# Patient Record
Sex: MALE | Race: WHITE | Employment: FULL TIME | URBAN - METROPOLITAN AREA
[De-identification: names, ages, dates, MRNs, and addresses within clinical notes are randomized per-mention and may not be internally consistent; named-entity substitution may affect disease eponyms.]

---

## 2018-02-16 ENCOUNTER — OFFICE VISIT (OUTPATIENT)
Dept: NEPHROLOGY | Facility: CLINIC | Age: 28
End: 2018-02-16

## 2018-02-16 VITALS
HEART RATE: 74 BPM | HEIGHT: 70 IN | DIASTOLIC BLOOD PRESSURE: 68 MMHG | BODY MASS INDEX: 21.19 KG/M2 | SYSTOLIC BLOOD PRESSURE: 106 MMHG | WEIGHT: 148 LBS

## 2018-02-16 DIAGNOSIS — Z94.0 RENAL TRANSPLANT RECIPIENT: ICD-10-CM

## 2018-02-16 DIAGNOSIS — I10 ESSENTIAL HYPERTENSION: Primary | ICD-10-CM

## 2018-02-16 PROCEDURE — 99213 OFFICE O/P EST LOW 20 MIN: CPT | Performed by: INTERNAL MEDICINE

## 2018-02-16 PROCEDURE — 90670 PCV13 VACCINE IM: CPT | Performed by: INTERNAL MEDICINE

## 2018-02-16 PROCEDURE — 90471 IMMUNIZATION ADMIN: CPT | Performed by: INTERNAL MEDICINE

## 2018-02-16 PROCEDURE — 99212 OFFICE O/P EST SF 10 MIN: CPT | Performed by: INTERNAL MEDICINE

## 2018-02-16 RX ORDER — ASPIRIN 81 MG/1
81 TABLET ORAL DAILY
COMMUNITY
Start: 2016-03-12 | End: 2020-01-08

## 2018-02-16 RX ORDER — CARVEDILOL 6.25 MG/1
6.25 TABLET ORAL 2 TIMES DAILY WITH MEALS
COMMUNITY
Start: 2017-08-09

## 2018-02-16 RX ORDER — OMEGA-3-ACID ETHYL ESTERS 1 G/1
2 CAPSULE, LIQUID FILLED ORAL 2 TIMES DAILY
COMMUNITY

## 2018-02-16 RX ORDER — PREDNISONE 1 MG/1
5 TABLET ORAL DAILY
COMMUNITY
Start: 2017-08-09

## 2018-02-16 RX ORDER — MYCOPHENOLIC ACID 180 MG/1
360 TABLET, DELAYED RELEASE ORAL
COMMUNITY
Start: 2017-08-09

## 2018-02-16 RX ORDER — TACROLIMUS 1 MG/1
3 CAPSULE ORAL 2 TIMES DAILY
COMMUNITY
Start: 2018-01-19

## 2018-02-16 NOTE — PROGRESS NOTES
Eloy Martinez is here expressing them seem to get a kidney transplant.   He is almost 2 years out he is being seen at [facility] Dr. Jevon Hall at his nephrologist and surgeon is doing well his blood pressures been good about 120/80 his meds include Prograf 3 twice daily CellC

## 2018-02-16 NOTE — PATIENT INSTRUCTIONS
Great job susana     keep creat 1.3 or less  Call if problems     Pneumnonia 13 today   need pneumonia 23  One year     great to see you    See me six months

## 2018-04-04 ENCOUNTER — MED REC SCAN ONLY (OUTPATIENT)
Dept: NEPHROLOGY | Facility: CLINIC | Age: 28
End: 2018-04-04

## 2018-08-22 ENCOUNTER — OFFICE VISIT (OUTPATIENT)
Dept: NEPHROLOGY | Facility: CLINIC | Age: 28
End: 2018-08-22
Payer: COMMERCIAL

## 2018-08-22 VITALS
HEART RATE: 58 BPM | HEIGHT: 70 IN | BODY MASS INDEX: 22.05 KG/M2 | DIASTOLIC BLOOD PRESSURE: 78 MMHG | SYSTOLIC BLOOD PRESSURE: 125 MMHG | WEIGHT: 154 LBS

## 2018-08-22 DIAGNOSIS — Z94.0 RENAL TRANSPLANT RECIPIENT: Primary | ICD-10-CM

## 2018-08-22 PROCEDURE — 99212 OFFICE O/P EST SF 10 MIN: CPT | Performed by: INTERNAL MEDICINE

## 2018-08-22 PROCEDURE — 99213 OFFICE O/P EST LOW 20 MIN: CPT | Performed by: INTERNAL MEDICINE

## 2018-08-22 NOTE — PATIENT INSTRUCTIONS
Please have [facility] fax me labs    Call me a few days after each lab draw to discuss    See me February    Flu shot September    Great misti Adler

## 2018-08-22 NOTE — PROGRESS NOTES
Luly Suma is here he is doing well he is about 2 years post kidney transplant.   Just had a second daughter feeling good getting labs done every 4 months request I did ask him to have the labs faxed to me my current fax number and to call me every time he gets la

## 2020-01-08 ENCOUNTER — OFFICE VISIT (OUTPATIENT)
Dept: NEPHROLOGY | Facility: CLINIC | Age: 30
End: 2020-01-08
Payer: COMMERCIAL

## 2020-01-08 VITALS
BODY MASS INDEX: 22.14 KG/M2 | SYSTOLIC BLOOD PRESSURE: 123 MMHG | DIASTOLIC BLOOD PRESSURE: 72 MMHG | HEART RATE: 80 BPM | WEIGHT: 154.63 LBS | HEIGHT: 70 IN

## 2020-01-08 DIAGNOSIS — N18.4 CHRONIC KIDNEY DISEASE, STAGE IV (SEVERE) (HCC): ICD-10-CM

## 2020-01-08 DIAGNOSIS — Z94.0 RENAL TRANSPLANT RECIPIENT: ICD-10-CM

## 2020-01-08 DIAGNOSIS — I10 ESSENTIAL HYPERTENSION: Primary | ICD-10-CM

## 2020-01-08 PROCEDURE — 99213 OFFICE O/P EST LOW 20 MIN: CPT | Performed by: INTERNAL MEDICINE

## 2020-01-08 PROCEDURE — 99212 OFFICE O/P EST SF 10 MIN: CPT | Performed by: INTERNAL MEDICINE

## 2020-01-08 NOTE — PATIENT INSTRUCTIONS
Genesis Weaver please do labs every 3 MONTHS    SEND ME MESSAGE ON MY CHART TO DISCUSS    SEE ME July    CONSIDER VASECTOMY    HAVE A GREAT YEAR

## 2020-01-09 NOTE — PROGRESS NOTES
Murrel Apgar is here he is not real obsessive about his labs he is meeting labs at [facility] but is not know his creatinine etc. is been about 3 years he received a transplant from his dad he does know his meds he is on tacrolimus 3 mg twice daily mycophenolate 720 twi

## 2020-07-08 ENCOUNTER — OFFICE VISIT (OUTPATIENT)
Dept: NEPHROLOGY | Facility: CLINIC | Age: 30
End: 2020-07-08
Payer: COMMERCIAL

## 2020-07-08 VITALS
HEIGHT: 70 IN | WEIGHT: 162 LBS | DIASTOLIC BLOOD PRESSURE: 80 MMHG | BODY MASS INDEX: 23.19 KG/M2 | SYSTOLIC BLOOD PRESSURE: 124 MMHG | TEMPERATURE: 98 F | HEART RATE: 98 BPM

## 2020-07-08 DIAGNOSIS — Z94.0 RENAL TRANSPLANT RECIPIENT: Primary | ICD-10-CM

## 2020-07-08 PROCEDURE — 99213 OFFICE O/P EST LOW 20 MIN: CPT | Performed by: INTERNAL MEDICINE

## 2020-07-08 PROCEDURE — 99212 OFFICE O/P EST SF 10 MIN: CPT | Performed by: INTERNAL MEDICINE

## 2020-07-08 NOTE — PATIENT INSTRUCTIONS
Please do labs January April July and October    Follow-up with gabriel chappell him on your health    See me in January have a great summer stay safe

## 2020-07-08 NOTE — PROGRESS NOTES
Progress Note     Abdiel Mcnally    Status post renal transplant 4 years ago he is doing great feeling well he is working he is up-to-date with all his meds he is watching his skin denies any chest pain shortness of breath urinating fine      HISTORY symptoms  Neurological:  Negative for gait disturbance  Psychiatric:  Negative for inappropriate interaction and psychiatric symptoms       07/08/20  1550   BP: 124/80   Pulse: 98   Temp: 97.7 °F (36.5 °C)       PHYSICAL EXAM:   Constitutional: appears wel

## 2020-11-25 ENCOUNTER — APPOINTMENT (OUTPATIENT)
Dept: LAB | Facility: HOSPITAL | Age: 30
End: 2020-11-25
Attending: INTERNAL MEDICINE
Payer: COMMERCIAL

## 2020-11-25 ENCOUNTER — TELEPHONE (OUTPATIENT)
Dept: NEPHROLOGY | Facility: CLINIC | Age: 30
End: 2020-11-25

## 2020-11-25 DIAGNOSIS — R50.81 FEVER IN OTHER DISEASES: ICD-10-CM

## 2020-11-25 DIAGNOSIS — R50.81 FEVER IN OTHER DISEASES: Primary | ICD-10-CM

## 2020-11-25 NOTE — TELEPHONE ENCOUNTER
Pt states he is very fatigue and has back pain. Pt also states he is coughing and states that he has a continuous smell of chlorine.  Pt requesting an order for covid test. Please call [phone]

## 2020-11-27 ENCOUNTER — TELEPHONE (OUTPATIENT)
Dept: NEPHROLOGY | Facility: CLINIC | Age: 30
End: 2020-11-27

## 2020-11-27 NOTE — TELEPHONE ENCOUNTER
Patient returned call. (Name and  of pt verified). All results and recommendations reviewed. Patient verbalizes understanding, denies further questions and agrees with plan of care.

## 2021-09-28 ENCOUNTER — TELEPHONE (OUTPATIENT)
Dept: NEPHROLOGY | Facility: CLINIC | Age: 31
End: 2021-09-28

## 2021-09-28 NOTE — TELEPHONE ENCOUNTER
Pt states he has excessive coughing, sore throat, chills, no fever, going to transfer to Rn his chest feels  bruised from coughing so much.

## 2021-09-28 NOTE — TELEPHONE ENCOUNTER
Left message to call back message on voicemail to be evaluated at Urgent Care as Dr. Governor Landeros is out of the office today. Advised to call back if patient has any questions or concerns.

## 2021-10-05 NOTE — TELEPHONE ENCOUNTER
Pt called in to give an update that he is feeling better but he thinks he has some hearing loss in left ear.  Please follow up

## 2022-10-10 NOTE — TELEPHONE ENCOUNTER
Spoke with patient, states he did go to urgent care and was prescribed an inhaler and is feeling better. Instructed pt to call us back in a few days if symptoms worsen. Airway patent, Nasal mucosa clear. Mouth with normal mucosa. Throat has no vesicles, no oropharyngeal exudates and uvula is midline.

## 2023-05-16 ENCOUNTER — PATIENT MESSAGE (OUTPATIENT)
Dept: ADMINISTRATIVE | Age: 33
End: 2023-05-16

## 2023-05-16 DIAGNOSIS — Z94.0 RENAL TRANSPLANT RECIPIENT: Primary | ICD-10-CM

## 2023-08-17 ENCOUNTER — OFFICE VISIT (OUTPATIENT)
Dept: NEPHROLOGY | Facility: CLINIC | Age: 33
End: 2023-08-17

## 2023-08-17 VITALS
SYSTOLIC BLOOD PRESSURE: 115 MMHG | DIASTOLIC BLOOD PRESSURE: 67 MMHG | HEART RATE: 92 BPM | BODY MASS INDEX: 24.62 KG/M2 | WEIGHT: 172 LBS | HEIGHT: 70 IN

## 2023-08-17 DIAGNOSIS — D17.1 LIPOMA OF TORSO: ICD-10-CM

## 2023-08-17 DIAGNOSIS — Z94.0 RENAL TRANSPLANT RECIPIENT: ICD-10-CM

## 2023-08-17 DIAGNOSIS — I10 ESSENTIAL HYPERTENSION: Primary | ICD-10-CM

## 2023-08-17 PROCEDURE — 3074F SYST BP LT 130 MM HG: CPT | Performed by: INTERNAL MEDICINE

## 2023-08-17 PROCEDURE — 3008F BODY MASS INDEX DOCD: CPT | Performed by: INTERNAL MEDICINE

## 2023-08-17 PROCEDURE — 99203 OFFICE O/P NEW LOW 30 MIN: CPT | Performed by: INTERNAL MEDICINE

## 2023-08-17 PROCEDURE — 3078F DIAST BP <80 MM HG: CPT | Performed by: INTERNAL MEDICINE

## 2023-08-17 NOTE — PATIENT INSTRUCTIONS
See dr Krystina Wong  [phone]      Do labs every January April July and October they have standing orders at the computer you can go to the [address] [place]    Let me know after you see the surgeon    Get your eyes checked    Flu shot October COVID-vaccine in November    See me back in 1 year    Bibi Gerardo to see you Momo Melo

## 2023-08-17 NOTE — TELEPHONE ENCOUNTER
Please put in standing orders  For CBC  CMP  UA  Tacrolimus level    For every 3 months for kidney transplant thank you

## 2023-08-17 NOTE — PROGRESS NOTES
Progress Note     Phil Harley    Urine doing well he 7 years post kidney transplant from his father pressures 115/67 labs are good BK virus is negative creatinine 1.0 he is on tacrolimus 3 mg twice a day mycophenolate 180 mg 3 tablets twice a day is on Coreg 6.25 twice daily prednisone 5/day doing fine with labs complains of a lump on his back needs to see the eye doctor no other skin issues breathing well no chest pain or shortness of breath urinating well on exam he does have a lipoma about 5 cm across his left shoulder blade it is movable appears benign      HISTORY:  Past Medical History:   Diagnosis Date    Gross hematuria     IgA nephropathy     POSSIBLE PER. NG.    Varicella       Past Surgical History:   Procedure Laterality Date    TRANSPLANTATION OF KIDNEY  03/2016      Social History    Tobacco Use      Smoking status: Former        Types: Cigarettes      Smokeless tobacco: Never    Alcohol use: No      Alcohol/week: 0.0 standard drinks of alcohol        Medications (Active prior to today's visit):  Current Outpatient Medications   Medication Sig Dispense Refill    tacrolimus 1 MG Oral Cap Take 3 capsules (3 mg total) by mouth 2 (two) times daily. Mycophenolate Sodium 180 MG Oral Tab EC Take 2 tablets (360 mg total) by mouth 2 (two) times daily before meals. predniSONE 5 MG Oral Tab Take 1 tablet (5 mg total) by mouth daily. carvedilol 6.25 MG Oral Tab Take 1 tablet (6.25 mg total) by mouth 2 (two) times daily with meals. Omega-3-acid Ethyl Esters 1 g Oral Cap Take 2 capsules (2 g total) by mouth 2 (two) times daily.       AmLODIPine Besylate (NORVASC) 10 MG Oral Tab TAKE 1 TABLET BY MOUTH EVERY DAY 30 tablet 0       Allergies:  No Known Allergies      ROS:     Constitutional:  Negative for decreased activity, fever, irritability and lethargy  ENMT:  Negative for ear drainage, hearing loss and nasal drainage  Eyes:  Negative for eye discharge and vision loss  Cardiovascular: Negative for chest pain, sob  Respiratory:  Negative for cough, dyspnea and wheezing  Gastrointestinal:  Negative for abdominal pain, constipation  Genitourinary:  Negative for dysuria and hematuria urinating fine  Endocrine:  Negative for abnormal sleep patterns  Hema/Lymph:  Negative for easy bleeding and easy bruising  Integumentary:  Negative for pruritus and rash  Musculoskeletal: 5 cm lipoma looking mass over his left scapula  Neurological:  Negative for gait disturbance  Psychiatric:  Negative for inappropriate interaction and psychiatric symptoms  No tenderness over left kidney site in the left lower quadrant  Skin clear   08/17/23  1743   BP: 115/67   Pulse: 92       PHYSICAL EXAM:   Constitutional: appears well hydrated alert and responsive   Head/Face: normocephalic  Eyes/Vision: normal extraocular motion is intact  Nose/Mouth/Throat:mucous membranes are moist   Neck/Thyroid: neck is supple without adenopathy  Lymphatic: no abnormal cervical, supraclavicular adenopathy is noted  Respiratory:  lungs are clear to auscultation bilaterally  Cardiovascular: regular rate and rhythm   Abdomen: soft, non-tender, non-distended, BS normal  Skin/Hair: no unusual rashes present, no abnormal bruising noted  Back/Spine: no abnormalities noted  Musculoskeletal: no deformities  Extremities: no edema  Neurological:  Grossly normal    See note above  ASSESSMENT/PLAN:   Assessment   Essential hypertension  (primary encounter diagnosis)  Renal transplant recipient  Lipoma of torso    #1 most likely lipoma see general surgery Dr. Luis F Man for eval  #2 renal transplant labs every 3 months standing orders placed  #3 hypertension controlled  #4 immunizations flu shot October COVID in November get a pneumonia 20 sometime in the near future    See me back in 1 year will call me after he sees Dr. Charley Stein This Visit:  No orders of the defined types were placed in this encounter.       Meds This Visit:  Requested Prescriptions      No prescriptions requested or ordered in this encounter       Imaging & Referrals:  None     8/17/2023  Jerome Bowers MD

## 2023-09-19 ENCOUNTER — TELEPHONE (OUTPATIENT)
Dept: SURGERY | Facility: CLINIC | Age: 33
End: 2023-09-19

## 2023-10-03 ENCOUNTER — OFFICE VISIT (OUTPATIENT)
Dept: SURGERY | Facility: CLINIC | Age: 33
End: 2023-10-03

## 2023-10-03 VITALS — BODY MASS INDEX: 25 KG/M2 | WEIGHT: 172 LBS

## 2023-10-03 DIAGNOSIS — L98.9 LESION OF SUBCUTANEOUS TISSUE: Primary | ICD-10-CM

## 2023-10-03 PROCEDURE — 99202 OFFICE O/P NEW SF 15 MIN: CPT | Performed by: SURGERY

## 2023-10-11 ENCOUNTER — TELEPHONE (OUTPATIENT)
Dept: SURGERY | Facility: CLINIC | Age: 33
End: 2023-10-11

## 2023-10-11 NOTE — TELEPHONE ENCOUNTER
Tomas Lance Patient  Member ID  [id number]    Date of Birth  [id number]    Gender  Male    Transaction Type  Outpatient Authorization    [address]    Payer  [facility] logo  Transaction ID: [id number] ID: [id number] Date: 2023-10-11  No Authorization Required  Service Type  2 - Surgical    Place of Service  25 - On [address] [place] [address]    Service From - To Date  NA    Procedure Code 1  63212 - EXC TR-EXT B9+LAURE >4.0 CM    Quantity  1 Units    Procedure From - To Date  2023-11-22 - 2024-02-22    Status  NO Anibal [address]    Vendor Name  [address] [place] [address]    Network Status  Unknown

## 2023-11-21 ENCOUNTER — ANESTHESIA EVENT (OUTPATIENT)
Dept: SURGERY | Facility: HOSPITAL | Age: 33
End: 2023-11-21
Payer: COMMERCIAL

## 2023-11-22 ENCOUNTER — ANESTHESIA (OUTPATIENT)
Dept: SURGERY | Facility: HOSPITAL | Age: 33
End: 2023-11-22
Payer: COMMERCIAL

## 2023-11-22 ENCOUNTER — HOSPITAL ENCOUNTER (OUTPATIENT)
Facility: HOSPITAL | Age: 33
Setting detail: HOSPITAL OUTPATIENT SURGERY
Discharge: HOME | End: 2023-11-22
Attending: SURGERY | Admitting: SURGERY
Payer: COMMERCIAL

## 2023-11-22 VITALS
BODY MASS INDEX: 24.2 KG/M2 | HEIGHT: 70 IN | WEIGHT: 169 LBS | SYSTOLIC BLOOD PRESSURE: 119 MMHG | OXYGEN SATURATION: 99 % | TEMPERATURE: 98 F | DIASTOLIC BLOOD PRESSURE: 72 MMHG | HEART RATE: 83 BPM | RESPIRATION RATE: 18 BRPM

## 2023-11-22 DIAGNOSIS — L98.9 LESION OF SUBCUTANEOUS TISSUE: Primary | ICD-10-CM

## 2023-11-22 PROCEDURE — 21931 EXC BACK LES SC 3 CM/>: CPT | Performed by: SURGERY

## 2023-11-22 PROCEDURE — 0HB6XZZ EXCISION OF BACK SKIN, EXTERNAL APPROACH: ICD-10-PCS | Performed by: SURGERY

## 2023-11-22 RX ORDER — ONDANSETRON 2 MG/ML
INJECTION INTRAMUSCULAR; INTRAVENOUS AS NEEDED
Status: DISCONTINUED | OUTPATIENT
Start: 2023-11-22 | End: 2023-11-22 | Stop reason: SURG

## 2023-11-22 RX ORDER — MORPHINE SULFATE 10 MG/ML
6 INJECTION, SOLUTION INTRAMUSCULAR; INTRAVENOUS EVERY 10 MIN PRN
Status: DISCONTINUED | OUTPATIENT
Start: 2023-11-22 | End: 2023-11-22

## 2023-11-22 RX ORDER — HYDROMORPHONE HYDROCHLORIDE 1 MG/ML
0.2 INJECTION, SOLUTION INTRAMUSCULAR; INTRAVENOUS; SUBCUTANEOUS EVERY 5 MIN PRN
Status: DISCONTINUED | OUTPATIENT
Start: 2023-11-22 | End: 2023-11-22

## 2023-11-22 RX ORDER — SODIUM CHLORIDE, SODIUM LACTATE, POTASSIUM CHLORIDE, CALCIUM CHLORIDE 600; 310; 30; 20 MG/100ML; MG/100ML; MG/100ML; MG/100ML
INJECTION, SOLUTION INTRAVENOUS CONTINUOUS
Status: DISCONTINUED | OUTPATIENT
Start: 2023-11-22 | End: 2023-11-22

## 2023-11-22 RX ORDER — ACETAMINOPHEN 500 MG
1000 TABLET ORAL ONCE
Status: COMPLETED | OUTPATIENT
Start: 2023-11-22 | End: 2023-11-22

## 2023-11-22 RX ORDER — POLYETHYLENE GLYCOL 3350 17 G/17G
17 POWDER, FOR SOLUTION ORAL DAILY
Qty: 14 PACKET | Refills: 0 | Status: SHIPPED | OUTPATIENT
Start: 2023-11-22 | End: 2023-12-06

## 2023-11-22 RX ORDER — MIDAZOLAM HYDROCHLORIDE 1 MG/ML
INJECTION INTRAMUSCULAR; INTRAVENOUS AS NEEDED
Status: DISCONTINUED | OUTPATIENT
Start: 2023-11-22 | End: 2023-11-22 | Stop reason: SURG

## 2023-11-22 RX ORDER — NALOXONE HYDROCHLORIDE 0.4 MG/ML
0.08 INJECTION, SOLUTION INTRAMUSCULAR; INTRAVENOUS; SUBCUTANEOUS AS NEEDED
Status: DISCONTINUED | OUTPATIENT
Start: 2023-11-22 | End: 2023-11-22

## 2023-11-22 RX ORDER — HYDROCODONE BITARTRATE AND ACETAMINOPHEN 5; 325 MG/1; MG/1
1 TABLET ORAL EVERY 6 HOURS PRN
Qty: 30 TABLET | Refills: 0 | Status: SHIPPED | OUTPATIENT
Start: 2023-11-22

## 2023-11-22 RX ORDER — HYDROMORPHONE HYDROCHLORIDE 1 MG/ML
0.6 INJECTION, SOLUTION INTRAMUSCULAR; INTRAVENOUS; SUBCUTANEOUS EVERY 5 MIN PRN
Status: DISCONTINUED | OUTPATIENT
Start: 2023-11-22 | End: 2023-11-22

## 2023-11-22 RX ORDER — PROCHLORPERAZINE EDISYLATE 5 MG/ML
5 INJECTION INTRAMUSCULAR; INTRAVENOUS EVERY 8 HOURS PRN
Status: DISCONTINUED | OUTPATIENT
Start: 2023-11-22 | End: 2023-11-22

## 2023-11-22 RX ORDER — CEFAZOLIN SODIUM/WATER 2 G/20 ML
2 SYRINGE (ML) INTRAVENOUS ONCE
Status: COMPLETED | OUTPATIENT
Start: 2023-11-22 | End: 2023-11-22

## 2023-11-22 RX ORDER — LIDOCAINE HYDROCHLORIDE 10 MG/ML
INJECTION, SOLUTION EPIDURAL; INFILTRATION; INTRACAUDAL; PERINEURAL AS NEEDED
Status: DISCONTINUED | OUTPATIENT
Start: 2023-11-22 | End: 2023-11-22 | Stop reason: SURG

## 2023-11-22 RX ORDER — MORPHINE SULFATE 4 MG/ML
4 INJECTION, SOLUTION INTRAMUSCULAR; INTRAVENOUS EVERY 10 MIN PRN
Status: DISCONTINUED | OUTPATIENT
Start: 2023-11-22 | End: 2023-11-22

## 2023-11-22 RX ORDER — ONDANSETRON 2 MG/ML
4 INJECTION INTRAMUSCULAR; INTRAVENOUS EVERY 6 HOURS PRN
Status: DISCONTINUED | OUTPATIENT
Start: 2023-11-22 | End: 2023-11-22

## 2023-11-22 RX ORDER — HYDROMORPHONE HYDROCHLORIDE 1 MG/ML
0.4 INJECTION, SOLUTION INTRAMUSCULAR; INTRAVENOUS; SUBCUTANEOUS EVERY 5 MIN PRN
Status: DISCONTINUED | OUTPATIENT
Start: 2023-11-22 | End: 2023-11-22

## 2023-11-22 RX ORDER — MORPHINE SULFATE 4 MG/ML
2 INJECTION, SOLUTION INTRAMUSCULAR; INTRAVENOUS EVERY 10 MIN PRN
Status: DISCONTINUED | OUTPATIENT
Start: 2023-11-22 | End: 2023-11-22

## 2023-11-22 RX ORDER — BUPIVACAINE HYDROCHLORIDE 5 MG/ML
INJECTION, SOLUTION EPIDURAL; INTRACAUDAL AS NEEDED
Status: DISCONTINUED | OUTPATIENT
Start: 2023-11-22 | End: 2023-11-22 | Stop reason: HOSPADM

## 2023-11-22 RX ADMIN — SODIUM CHLORIDE, SODIUM LACTATE, POTASSIUM CHLORIDE, CALCIUM CHLORIDE: 600; 310; 30; 20 INJECTION, SOLUTION INTRAVENOUS at 14:54:00

## 2023-11-22 RX ADMIN — ONDANSETRON 4 MG: 2 INJECTION INTRAMUSCULAR; INTRAVENOUS at 14:33:00

## 2023-11-22 RX ADMIN — MIDAZOLAM HYDROCHLORIDE 2 MG: 1 INJECTION INTRAMUSCULAR; INTRAVENOUS at 13:57:00

## 2023-11-22 RX ADMIN — LIDOCAINE HYDROCHLORIDE 50 MG: 10 INJECTION, SOLUTION EPIDURAL; INFILTRATION; INTRACAUDAL; PERINEURAL at 14:03:00

## 2023-11-22 RX ADMIN — CEFAZOLIN SODIUM/WATER 2 G: 2 G/20 ML SYRINGE (ML) INTRAVENOUS at 14:07:00

## 2023-11-22 NOTE — O[place]ATIVE REPORT
[facility] [place]    Patient Name:  Evelin Jasso  MR:  [id number]  :  1990  DOS:  23    Preop Dx: Lesion of subcutaneous tissue [L98.9]  Postop Dx: Lesion of subcutaneous tissue [L98.9]  Procedure:    Excision of upper back cyst, 5 cm down to back muscle  Layered closure  Surgeon:  Henry Naik MD  Surgical Assistant.: Jayy Castillo CSA, Eric Diamond MD  EBL: Blood Output: 10 mL (2023  9:68 PM)    Complication:  None    INDICATION:  Pt is a 26year old male who with Lesion of subcutaneous tissue [L98.9] who is scheduled for a Excision of upper back cyst and layered closure. CONSENT:  An informed consent discussion was held with the patient regarding the nature of lesions of the subcutaneous tissue, the treatment options and the details of the procedure. The risks including but not limited to bleeding, wound infection, incomplete resection, and recurrence were discussed. The patient expressed understanding and want to proceed with the planned procedure. TECHNIQUE:  The patient was taken to the OR and placed in right decubitus position. MAC was established and the skin of the upper back area was prepped in standard fashion. A solution of 0.5% marcaine was used to infiltrate the skin and subcutaneous tissue. An incision was made over the lesion down to the subcutaneous tissue. The lesion was excised entirely measuring 5 x 4 cm. It has the gross appearance of a large cyst.  The cyst was adherent to the back muscle and fascia. The wound was irrigated with saline and hemostasis was obtained using electrocautery. The skin incision was closed in layers using 3-0 and 4-0 vicryl. Sterile dressings were applied. All instrument and sponge counts were correct. I was present during the critical portions of the procedure.     Henry Naik MD

## 2023-12-05 ENCOUNTER — TELEPHONE (OUTPATIENT)
Dept: SURGERY | Facility: CLINIC | Age: 33
End: 2023-12-05

## 2023-12-05 DIAGNOSIS — C49.9 SARCOMA (HCC): Primary | ICD-10-CM

## 2023-12-05 NOTE — TELEPHONE ENCOUNTER
Good Morning Dr. Gonzalez     PC from Pathology, 12-5-2023 @ 9:15 am.  Dr. SEA Barnes from Pathology.  He would like you to call him at [phone].     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     Ema

## 2023-12-20 RX ORDER — CARVEDILOL 6.25 MG/1
6.25 TABLET ORAL 2 TIMES DAILY WITH MEALS
Qty: 180 TABLET | Refills: 3 | Status: SHIPPED | OUTPATIENT
Start: 2023-12-20

## 2023-12-20 RX ORDER — TACROLIMUS 1 MG/1
3 CAPSULE ORAL 2 TIMES DAILY
Qty: 540 CAPSULE | Refills: 3 | Status: SHIPPED | OUTPATIENT
Start: 2023-12-20 | End: 2023-12-22

## 2023-12-20 RX ORDER — MYCOPHENOLIC ACID 180 MG/1
360 TABLET, DELAYED RELEASE ORAL
Qty: 360 TABLET | Refills: 3 | Status: SHIPPED | OUTPATIENT
Start: 2023-12-20 | End: 2023-12-22

## 2023-12-22 ENCOUNTER — PATIENT MESSAGE (OUTPATIENT)
Dept: NEPHROLOGY | Facility: CLINIC | Age: 33
End: 2023-12-22

## 2023-12-22 RX ORDER — AZITHROMYCIN 250 MG/1
TABLET, FILM COATED ORAL
Qty: 6 TABLET | Refills: 0 | Status: SHIPPED | OUTPATIENT
Start: 2023-12-22

## 2023-12-22 RX ORDER — MYCOPHENOLIC ACID 180 MG/1
360 TABLET, DELAYED RELEASE ORAL
Qty: 360 TABLET | Refills: 3 | Status: SHIPPED | OUTPATIENT
Start: 2023-12-22

## 2023-12-22 RX ORDER — TACROLIMUS 1 MG/1
3 CAPSULE ORAL 2 TIMES DAILY
Qty: 540 CAPSULE | Refills: 3 | Status: SHIPPED | OUTPATIENT
Start: 2023-12-22

## 2023-12-22 RX ORDER — HYDROCODONE BITARTRATE AND ACETAMINOPHEN 5; 325 MG/1; MG/1
1 TABLET ORAL EVERY 6 HOURS PRN
Qty: 30 TABLET | Refills: 0 | OUTPATIENT
Start: 2023-12-22

## 2023-12-22 NOTE — TELEPHONE ENCOUNTER
Pt calling for status of Rx to be sent [address] [place] [address]. Pt states the Rx for Tacrolimus and Mycophenolate sodium can not be picked up at [facility].   Phone # for Benicar is [phone]

## 2023-12-22 NOTE — TELEPHONE ENCOUNTER
Dr. Peterson Encarnacion, this warning popped up when ordering the z-margaret. Okay to order still? Pended. Significance: Major     Warning: Plasma concentrations and pharmacologic effects of tacrolimus may be increased by azithromycin.

## 2023-12-22 NOTE — TELEPHONE ENCOUNTER
Dr[facility] Sheryl Woodson[facility] I know you won't be able to see him.

## 2023-12-22 NOTE — TELEPHONE ENCOUNTER
LOV- 8/17/23    RTC- 1 year    F/U- no appointment scheduled    Rx sent per protocol when MD is out of the office.

## 2023-12-22 NOTE — TELEPHONE ENCOUNTER
Patient calling back states that was told yesterday by pharmacy that is specialty medication so they will not carry these medications. Asked if pt called pharmacy states no that this was yesterday. Please call and advise.

## 2023-12-22 NOTE — TELEPHONE ENCOUNTER
Agatha Phillips MD  You23 minutes ago (10:36 AM)     Please send in a Z-Andrew for him Diana Prayer tell him to continue taking the benzene eight and some over-the-counter cough medicine. Tell him that I am out of town for the holiday. He could see me one day next week if he needs to. Thank you.

## 2023-12-27 ENCOUNTER — APPOINTMENT (OUTPATIENT)
Dept: GENERAL RADIOLOGY | Facility: HOSPITAL | Age: 33
End: 2023-12-27
Attending: NURSE PRACTITIONER
Payer: COMMERCIAL

## 2023-12-27 ENCOUNTER — HOSPITAL ENCOUNTER (EMERGENCY)
Facility: HOSPITAL | Age: 33
Discharge: HOME | End: 2023-12-27
Payer: COMMERCIAL

## 2023-12-27 VITALS
BODY MASS INDEX: 22.9 KG/M2 | DIASTOLIC BLOOD PRESSURE: 77 MMHG | WEIGHT: 160 LBS | RESPIRATION RATE: 17 BRPM | HEIGHT: 70 IN | HEART RATE: 89 BPM | OXYGEN SATURATION: 98 % | TEMPERATURE: 99 F | SYSTOLIC BLOOD PRESSURE: 131 MMHG

## 2023-12-27 DIAGNOSIS — J18.9 COMMUNITY ACQUIRED PNEUMONIA, UNSPECIFIED LATERALITY: Primary | ICD-10-CM

## 2023-12-27 LAB
ANION GAP SERPL CALC-SCNC: 10 MMOL/L (ref 0–18)
BASOPHILS # BLD AUTO: 0.04 X10(3) UL (ref 0–0.2)
BASOPHILS NFR BLD AUTO: 0.3 %
BILIRUB UR QL: NEGATIVE
BUN BLD-MCNC: 16 MG/DL (ref 9–23)
BUN/CREAT SERPL: 11.8 (ref 10–20)
CALCIUM BLD-MCNC: 9.7 MG/DL (ref 8.7–10.4)
CHLORIDE SERPL-SCNC: 102 MMOL/L (ref 98–112)
CLARITY UR: CLEAR
CO2 SERPL-SCNC: 21 MMOL/L (ref 21–32)
COLOR UR: YELLOW
CREAT BLD-MCNC: 1.36 MG/DL
DEPRECATED RDW RBC AUTO: 40 FL (ref 35.1–46.3)
EGFRCR SERPLBLD CKD-EPI 2021: 70 ML/MIN/1.73M2 (ref 60–?)
EOSINOPHIL # BLD AUTO: 0.09 X10(3) UL (ref 0–0.7)
EOSINOPHIL NFR BLD AUTO: 0.7 %
ERYTHROCYTE [DISTWIDTH] IN BLOOD BY AUTOMATED COUNT: 12.2 % (ref 11–15)
FLUAV + FLUBV RNA SPEC NAA+PROBE: NEGATIVE
FLUAV + FLUBV RNA SPEC NAA+PROBE: NEGATIVE
GLUCOSE BLD-MCNC: 115 MG/DL (ref 70–99)
GLUCOSE UR-MCNC: NORMAL MG/DL
GRAN CASTS #/AREA URNS LPF: PRESENT /LPF
HCT VFR BLD AUTO: 35.6 %
HGB BLD-MCNC: 12.3 G/DL
HGB UR QL STRIP.AUTO: NEGATIVE
HYALINE CASTS #/AREA URNS AUTO: PRESENT /LPF
IMM GRANULOCYTES # BLD AUTO: 0.07 X10(3) UL (ref 0–1)
IMM GRANULOCYTES NFR BLD: 0.5 %
LACTATE SERPL-SCNC: 0.9 MMOL/L (ref 0.5–2)
LEUKOCYTE ESTERASE UR QL STRIP.AUTO: NEGATIVE
LYMPHOCYTES # BLD AUTO: 1.71 X10(3) UL (ref 1–4)
LYMPHOCYTES NFR BLD AUTO: 12.9 %
MCH RBC QN AUTO: 30.8 PG (ref 26–34)
MCHC RBC AUTO-ENTMCNC: 34.6 G/DL (ref 31–37)
MCV RBC AUTO: 89.2 FL
MONOCYTES # BLD AUTO: 0.97 X10(3) UL (ref 0.1–1)
MONOCYTES NFR BLD AUTO: 7.3 %
NEUTROPHILS # BLD AUTO: 10.35 X10 (3) UL (ref 1.5–7.7)
NEUTROPHILS # BLD AUTO: 10.35 X10(3) UL (ref 1.5–7.7)
NEUTROPHILS NFR BLD AUTO: 78.3 %
NITRITE UR QL STRIP.AUTO: NEGATIVE
OSMOLALITY SERPL CALC.SUM OF ELEC: 278 MOSM/KG (ref 275–295)
PH UR: 5.5 [PH] (ref 5–8)
PLATELET # BLD AUTO: 390 10(3)UL (ref 150–450)
POTASSIUM SERPL-SCNC: 3.7 MMOL/L (ref 3.5–5.1)
PROCALCITONIN SERPL-MCNC: 2.27 NG/ML (ref ?–0.05)
PROT UR-MCNC: 50 MG/DL
RBC # BLD AUTO: 3.99 X10(6)UL
RSV RNA SPEC NAA+PROBE: NEGATIVE
SARS-COV-2 RNA RESP QL NAA+PROBE: NOT DETECTED
SODIUM SERPL-SCNC: 133 MMOL/L (ref 136–145)
SP GR UR STRIP: 1.02 (ref 1–1.03)
UROBILINOGEN UR STRIP-ACNC: NORMAL
WBC # BLD AUTO: 13.2 X10(3) UL (ref 4–11)

## 2023-12-27 PROCEDURE — 71045 X-RAY EXAM CHEST 1 VIEW: CPT | Performed by: NURSE PRACTITIONER

## 2023-12-27 PROCEDURE — 96374 THER/PROPH/DIAG INJ IV PUSH: CPT

## 2023-12-27 PROCEDURE — 0241U SARS-COV-2/FLU A AND B/RSV BY PCR (GENEXPERT): CPT

## 2023-12-27 PROCEDURE — 85025 COMPLETE CBC W/AUTO DIFF WBC: CPT | Performed by: NURSE PRACTITIONER

## 2023-12-27 PROCEDURE — 87040 BLOOD CULTURE FOR BACTERIA: CPT | Performed by: NURSE PRACTITIONER

## 2023-12-27 PROCEDURE — 80048 BASIC METABOLIC PNL TOTAL CA: CPT | Performed by: NURSE PRACTITIONER

## 2023-12-27 PROCEDURE — 99284 EMERGENCY DEPT VISIT MOD MDM: CPT

## 2023-12-27 PROCEDURE — 83605 ASSAY OF LACTIC ACID: CPT | Performed by: NURSE PRACTITIONER

## 2023-12-27 PROCEDURE — 96361 HYDRATE IV INFUSION ADD-ON: CPT

## 2023-12-27 PROCEDURE — 84145 PROCALCITONIN (PCT): CPT | Performed by: NURSE PRACTITIONER

## 2023-12-27 PROCEDURE — 81001 URINALYSIS AUTO W/SCOPE: CPT | Performed by: NURSE PRACTITIONER

## 2023-12-27 PROCEDURE — 36415 COLL VENOUS BLD VENIPUNCTURE: CPT

## 2023-12-27 RX ORDER — DOXYCYCLINE HYCLATE 100 MG/1
100 CAPSULE ORAL ONCE
Status: COMPLETED | OUTPATIENT
Start: 2023-12-27 | End: 2023-12-27

## 2023-12-27 RX ORDER — DOXYCYCLINE HYCLATE 100 MG/1
100 CAPSULE ORAL 2 TIMES DAILY
Qty: 14 CAPSULE | Refills: 0 | Status: SHIPPED | OUTPATIENT
Start: 2023-12-27 | End: 2024-01-03

## 2023-12-27 RX ORDER — AMOXICILLIN AND CLAVULANATE POTASSIUM 875; 125 MG/1; MG/1
1 TABLET, FILM COATED ORAL 2 TIMES DAILY
Qty: 20 TABLET | Refills: 0 | Status: SHIPPED | OUTPATIENT
Start: 2023-12-27 | End: 2024-01-06

## 2023-12-27 NOTE — ED INITIAL ASSESSMENT (HPI)
Pt ambulatory through triage c/o escalating cough, NVD, and SOB on the 23rd, fever this am 101.6 and took tylenol. Denies sick contacts. Went to [address] [place] [address] Thursday and prescribed zpak, but was not aware until pharmacy called him. Picking up zpak today.  99.1 in triage

## 2023-12-28 ENCOUNTER — TELEPHONE (OUTPATIENT)
Dept: NEPHROLOGY | Facility: CLINIC | Age: 33
End: 2023-12-28

## 2023-12-28 NOTE — TELEPHONE ENCOUNTER
Lmtcb. If pt returns call, please confirm if he can come to hospital f/u with Dr. Elizabeth Eubanks tomorrow ([id number]) at 4 pm. Thank you. Mychart msg also sent.

## 2023-12-28 NOTE — TELEPHONE ENCOUNTER
Pt is calling to schedule a hospital follow up, due to pneumonia. He states he was told he needed to be seen tomorrow. Please call.

## 2023-12-28 NOTE — TELEPHONE ENCOUNTER
Patient returning call and informed message was sent to him as well. Patient indicates he will reply back to confirm that he will be there tomorrow at 4:00 pm, thanks.

## 2023-12-28 NOTE — TELEPHONE ENCOUNTER
Oneil Roque MD  [facility], RN  Caller: Unspecified (Today,  8:06 AM)  4 PM tomorrow is Rebeca Gilmore

## 2023-12-29 ENCOUNTER — HOSPITAL ENCOUNTER (INPATIENT)
Facility: HOSPITAL | Age: 33
LOS: 1 days | Discharge: HOME | End: 2023-12-31
Attending: STUDENT IN AN ORGANIZED HEALTH CARE EDUCATION/TRAINING PROGRAM | Admitting: STUDENT IN AN ORGANIZED HEALTH CARE EDUCATION/TRAINING PROGRAM
Payer: COMMERCIAL

## 2023-12-29 ENCOUNTER — APPOINTMENT (OUTPATIENT)
Dept: GENERAL RADIOLOGY | Facility: HOSPITAL | Age: 33
End: 2023-12-29
Payer: COMMERCIAL

## 2023-12-29 ENCOUNTER — OFFICE VISIT (OUTPATIENT)
Dept: NEPHROLOGY | Facility: CLINIC | Age: 33
End: 2023-12-29

## 2023-12-29 VITALS
DIASTOLIC BLOOD PRESSURE: 57 MMHG | HEART RATE: 102 BPM | WEIGHT: 162 LBS | BODY MASS INDEX: 23 KG/M2 | OXYGEN SATURATION: 95 % | SYSTOLIC BLOOD PRESSURE: 93 MMHG

## 2023-12-29 DIAGNOSIS — J18.9 COMMUNITY ACQUIRED PNEUMONIA, UNSPECIFIED LATERALITY: Primary | ICD-10-CM

## 2023-12-29 DIAGNOSIS — I10 ESSENTIAL HYPERTENSION: Primary | ICD-10-CM

## 2023-12-29 DIAGNOSIS — Z94.0 RENAL TRANSPLANT RECIPIENT: ICD-10-CM

## 2023-12-29 DIAGNOSIS — J18.9 PNEUMONIA OF LEFT LOWER LOBE DUE TO INFECTIOUS ORGANISM: ICD-10-CM

## 2023-12-29 LAB
ALBUMIN SERPL-MCNC: 4.2 G/DL (ref 3.2–4.8)
ALBUMIN/GLOB SERPL: 1 {RATIO} (ref 1–2)
ALP LIVER SERPL-CCNC: 74 U/L
ALT SERPL-CCNC: 31 U/L
ANION GAP SERPL CALC-SCNC: 7 MMOL/L (ref 0–18)
AST SERPL-CCNC: 24 U/L (ref ?–34)
BASOPHILS # BLD AUTO: 0.02 X10(3) UL (ref 0–0.2)
BASOPHILS NFR BLD AUTO: 0.2 %
BILIRUB SERPL-MCNC: 0.7 MG/DL (ref 0.3–1.2)
BUN BLD-MCNC: 12 MG/DL (ref 9–23)
BUN/CREAT SERPL: 9.4 (ref 10–20)
CALCIUM BLD-MCNC: 9.6 MG/DL (ref 8.7–10.4)
CHLORIDE SERPL-SCNC: 106 MMOL/L (ref 98–112)
CO2 SERPL-SCNC: 22 MMOL/L (ref 21–32)
CREAT BLD-MCNC: 1.27 MG/DL
DEPRECATED RDW RBC AUTO: 39.8 FL (ref 35.1–46.3)
EGFRCR SERPLBLD CKD-EPI 2021: 76 ML/MIN/1.73M2 (ref 60–?)
EOSINOPHIL # BLD AUTO: 0.13 X10(3) UL (ref 0–0.7)
EOSINOPHIL NFR BLD AUTO: 1.2 %
ERYTHROCYTE [DISTWIDTH] IN BLOOD BY AUTOMATED COUNT: 12.2 % (ref 11–15)
GLOBULIN PLAS-MCNC: 4.1 G/DL (ref 2.8–4.4)
GLUCOSE BLD-MCNC: 106 MG/DL (ref 70–99)
HCT VFR BLD AUTO: 33.9 %
HGB BLD-MCNC: 11.7 G/DL
IMM GRANULOCYTES # BLD AUTO: 0.05 X10(3) UL (ref 0–1)
IMM GRANULOCYTES NFR BLD: 0.5 %
LYMPHOCYTES # BLD AUTO: 2.59 X10(3) UL (ref 1–4)
LYMPHOCYTES NFR BLD AUTO: 24.3 %
MCH RBC QN AUTO: 31.1 PG (ref 26–34)
MCHC RBC AUTO-ENTMCNC: 34.5 G/DL (ref 31–37)
MCV RBC AUTO: 90.2 FL
MONOCYTES # BLD AUTO: 1.06 X10(3) UL (ref 0.1–1)
MONOCYTES NFR BLD AUTO: 9.9 %
NEUTROPHILS # BLD AUTO: 6.82 X10 (3) UL (ref 1.5–7.7)
NEUTROPHILS # BLD AUTO: 6.82 X10(3) UL (ref 1.5–7.7)
NEUTROPHILS NFR BLD AUTO: 63.9 %
OSMOLALITY SERPL CALC.SUM OF ELEC: 280 MOSM/KG (ref 275–295)
PLATELET # BLD AUTO: 457 10(3)UL (ref 150–450)
POTASSIUM SERPL-SCNC: 3.3 MMOL/L (ref 3.5–5.1)
PROT SERPL-MCNC: 8.3 G/DL (ref 5.7–8.2)
RBC # BLD AUTO: 3.76 X10(6)UL
SODIUM SERPL-SCNC: 135 MMOL/L (ref 136–145)
WBC # BLD AUTO: 10.7 X10(3) UL (ref 4–11)

## 2023-12-29 PROCEDURE — 3078F DIAST BP <80 MM HG: CPT | Performed by: INTERNAL MEDICINE

## 2023-12-29 PROCEDURE — 99223 1ST HOSP IP/OBS HIGH 75: CPT | Performed by: STUDENT IN AN ORGANIZED HEALTH CARE EDUCATION/TRAINING PROGRAM

## 2023-12-29 PROCEDURE — 3074F SYST BP LT 130 MM HG: CPT | Performed by: INTERNAL MEDICINE

## 2023-12-29 PROCEDURE — 99214 OFFICE O/P EST MOD 30 MIN: CPT | Performed by: INTERNAL MEDICINE

## 2023-12-29 PROCEDURE — 71045 X-RAY EXAM CHEST 1 VIEW: CPT

## 2023-12-29 RX ORDER — CARVEDILOL 6.25 MG/1
6.25 TABLET ORAL ONCE
Status: COMPLETED | OUTPATIENT
Start: 2023-12-29 | End: 2023-12-29

## 2023-12-29 RX ORDER — AMLODIPINE BESYLATE 5 MG/1
10 TABLET ORAL ONCE
Status: COMPLETED | OUTPATIENT
Start: 2023-12-29 | End: 2023-12-29

## 2023-12-29 RX ORDER — TACROLIMUS 1 MG/1
1 CAPSULE ORAL ONCE
Status: COMPLETED | OUTPATIENT
Start: 2023-12-29 | End: 2023-12-29

## 2023-12-29 RX ORDER — MYCOPHENOLIC ACID 180 MG/1
180 TABLET, DELAYED RELEASE ORAL ONCE
Status: COMPLETED | OUTPATIENT
Start: 2023-12-29 | End: 2023-12-29

## 2023-12-30 LAB
ADENOVIRUS F 40/41 PCR: NEGATIVE
ADENOVIRUS PCR:: NOT DETECTED
ANION GAP SERPL CALC-SCNC: 8 MMOL/L (ref 0–18)
ASTROVIRUS PCR: NEGATIVE
ATRIAL RATE: 90 BPM
B PARAPERT DNA SPEC QL NAA+PROBE: NOT DETECTED
B PERT DNA SPEC QL NAA+PROBE: NOT DETECTED
BASOPHILS # BLD AUTO: 0.02 X10(3) UL (ref 0–0.2)
BASOPHILS NFR BLD AUTO: 0.3 %
BUN BLD-MCNC: 13 MG/DL (ref 9–23)
BUN/CREAT SERPL: 12.6 (ref 10–20)
C CAYETANENSIS DNA SPEC QL NAA+PROBE: NEGATIVE
C DIFF TOX B STL QL: NEGATIVE
C PNEUM DNA SPEC QL NAA+PROBE: NOT DETECTED
CALCIUM BLD-MCNC: 9.4 MG/DL (ref 8.7–10.4)
CAMPY SP DNA.DIARRHEA STL QL NAA+PROBE: NEGATIVE
CHLORIDE SERPL-SCNC: 110 MMOL/L (ref 98–112)
CO2 SERPL-SCNC: 22 MMOL/L (ref 21–32)
CORONAVIRUS 229E PCR:: NOT DETECTED
CORONAVIRUS HKU1 PCR:: NOT DETECTED
CORONAVIRUS NL63 PCR:: NOT DETECTED
CORONAVIRUS OC43 PCR:: NOT DETECTED
CREAT BLD-MCNC: 1.03 MG/DL
CRYPTOSP DNA SPEC QL NAA+PROBE: NEGATIVE
DEPRECATED RDW RBC AUTO: 39.9 FL (ref 35.1–46.3)
EAEC PAA PLAS AGGR+AATA ST NAA+NON-PRB: NEGATIVE
EC STX1+STX2 + H7 FLIC SPEC NAA+PROBE: NEGATIVE
EGFRCR SERPLBLD CKD-EPI 2021: 97 ML/MIN/1.73M2 (ref 60–?)
ENTAMOEBA HISTOLYTICA PCR: NEGATIVE
EOSINOPHIL # BLD AUTO: 0.12 X10(3) UL (ref 0–0.7)
EOSINOPHIL NFR BLD AUTO: 1.7 %
EPEC EAE GENE STL QL NAA+NON-PROBE: NEGATIVE
ERYTHROCYTE [DISTWIDTH] IN BLOOD BY AUTOMATED COUNT: 12 % (ref 11–15)
ETEC LTA+ST1A+ST1B TOX ST NAA+NON-PROBE: NEGATIVE
FLUAV RNA SPEC QL NAA+PROBE: NOT DETECTED
FLUBV RNA SPEC QL NAA+PROBE: NOT DETECTED
GIARDIA LAMBLIA PCR: NEGATIVE
GLUCOSE BLD-MCNC: 116 MG/DL (ref 70–99)
HCT VFR BLD AUTO: 33 %
HGB BLD-MCNC: 11.1 G/DL
IMM GRANULOCYTES # BLD AUTO: 0.03 X10(3) UL (ref 0–1)
IMM GRANULOCYTES NFR BLD: 0.4 %
L PNEUMO AG UR QL: NEGATIVE
LYMPHOCYTES # BLD AUTO: 2.19 X10(3) UL (ref 1–4)
LYMPHOCYTES NFR BLD AUTO: 30.6 %
MAGNESIUM SERPL-MCNC: 1.1 MG/DL (ref 1.6–2.6)
MCH RBC QN AUTO: 30.6 PG (ref 26–34)
MCHC RBC AUTO-ENTMCNC: 33.6 G/DL (ref 31–37)
MCV RBC AUTO: 90.9 FL
METAPNEUMOVIRUS PCR:: NOT DETECTED
MONOCYTES # BLD AUTO: 0.7 X10(3) UL (ref 0.1–1)
MONOCYTES NFR BLD AUTO: 9.8 %
MYCOPLASMA PNEUMONIA PCR:: NOT DETECTED
NEUTROPHILS # BLD AUTO: 4.1 X10 (3) UL (ref 1.5–7.7)
NEUTROPHILS # BLD AUTO: 4.1 X10(3) UL (ref 1.5–7.7)
NEUTROPHILS NFR BLD AUTO: 57.2 %
NOROVIRUS GI/GII PCR: NEGATIVE
OSMOLALITY SERPL CALC.SUM OF ELEC: 291 MOSM/KG (ref 275–295)
P AXIS: 86 DEGREES
P SHIGELLOIDES DNA STL QL NAA+PROBE: NEGATIVE
P-R INTERVAL: 148 MS
PARAINFLUENZA 1 PCR:: NOT DETECTED
PARAINFLUENZA 2 PCR:: NOT DETECTED
PARAINFLUENZA 3 PCR:: NOT DETECTED
PARAINFLUENZA 4 PCR:: NOT DETECTED
PLATELET # BLD AUTO: 402 10(3)UL (ref 150–450)
POTASSIUM SERPL-SCNC: 3.7 MMOL/L (ref 3.5–5.1)
PROCALCITONIN SERPL-MCNC: 0.6 NG/ML (ref ?–0.05)
Q-T INTERVAL: 348 MS
QRS DURATION: 86 MS
QTC CALCULATION (BEZET): 425 MS
R AXIS: 88 DEGREES
RBC # BLD AUTO: 3.63 X10(6)UL
RHINOVIRUS/ENTERO PCR:: NOT DETECTED
ROTAVIRUS A PCR: NEGATIVE
RSV RNA SPEC QL NAA+PROBE: NOT DETECTED
SALMONELLA DNA SPEC QL NAA+PROBE: NEGATIVE
SAPOVIRUS PCR: NEGATIVE
SARS-COV-2 RNA NPH QL NAA+NON-PROBE: NOT DETECTED
SHIGELLA SP+EIEC IPAH ST NAA+NON-PROBE: NEGATIVE
SODIUM SERPL-SCNC: 140 MMOL/L (ref 136–145)
STREP PNEUMO ANTIGEN, URINE: NEGATIVE
T AXIS: 41 DEGREES
V CHOLERAE DNA SPEC QL NAA+PROBE: NEGATIVE
VENTRICULAR RATE: 90 BPM
VIBRIO DNA SPEC NAA+PROBE: NEGATIVE
WBC # BLD AUTO: 7.2 X10(3) UL (ref 4–11)
YERSINIA DNA SPEC NAA+PROBE: NEGATIVE

## 2023-12-30 PROCEDURE — 99233 SBSQ HOSP IP/OBS HIGH 50: CPT | Performed by: HOSPITALIST

## 2023-12-30 PROCEDURE — 99255 IP/OBS CONSLTJ NEW/EST HI 80: CPT | Performed by: INTERNAL MEDICINE

## 2023-12-30 RX ORDER — ACETAMINOPHEN 500 MG
500 TABLET ORAL EVERY 4 HOURS PRN
Status: DISCONTINUED | OUTPATIENT
Start: 2023-12-30 | End: 2023-12-31

## 2023-12-30 RX ORDER — HEPARIN SODIUM 5000 [USP'U]/ML
5000 INJECTION, SOLUTION INTRAVENOUS; SUBCUTANEOUS EVERY 12 HOURS SCHEDULED
Status: DISCONTINUED | OUTPATIENT
Start: 2023-12-30 | End: 2023-12-31

## 2023-12-30 RX ORDER — SODIUM CHLORIDE 9 MG/ML
INJECTION, SOLUTION INTRAVENOUS CONTINUOUS
Status: ACTIVE | OUTPATIENT
Start: 2023-12-30 | End: 2023-12-30

## 2023-12-30 RX ORDER — AMLODIPINE BESYLATE 10 MG/1
10 TABLET ORAL DAILY
Status: DISCONTINUED | OUTPATIENT
Start: 2023-12-30 | End: 2023-12-31

## 2023-12-30 RX ORDER — PROCHLORPERAZINE EDISYLATE 5 MG/ML
5 INJECTION INTRAMUSCULAR; INTRAVENOUS EVERY 8 HOURS PRN
Status: DISCONTINUED | OUTPATIENT
Start: 2023-12-30 | End: 2023-12-31

## 2023-12-30 RX ORDER — CARVEDILOL 6.25 MG/1
6.25 TABLET ORAL 2 TIMES DAILY WITH MEALS
Status: DISCONTINUED | OUTPATIENT
Start: 2023-12-30 | End: 2023-12-31

## 2023-12-30 RX ORDER — AZITHROMYCIN 250 MG/1
250 TABLET, FILM COATED ORAL DAILY
Status: DISCONTINUED | OUTPATIENT
Start: 2023-12-30 | End: 2023-12-30

## 2023-12-30 RX ORDER — MAGNESIUM OXIDE 400 MG/1
800 TABLET ORAL ONCE
Status: COMPLETED | OUTPATIENT
Start: 2023-12-30 | End: 2023-12-30

## 2023-12-30 RX ORDER — BISACODYL 10 MG
10 SUPPOSITORY, RECTAL RECTAL
Status: DISCONTINUED | OUTPATIENT
Start: 2023-12-30 | End: 2023-12-31

## 2023-12-30 RX ORDER — POLYETHYLENE GLYCOL 3350 17 G/17G
17 POWDER, FOR SOLUTION ORAL DAILY PRN
Status: DISCONTINUED | OUTPATIENT
Start: 2023-12-30 | End: 2023-12-31

## 2023-12-30 RX ORDER — ENEMA 19; 7 G/133ML; G/133ML
1 ENEMA RECTAL ONCE AS NEEDED
Status: DISCONTINUED | OUTPATIENT
Start: 2023-12-30 | End: 2023-12-31

## 2023-12-30 RX ORDER — TACROLIMUS 1 MG/1
3 CAPSULE ORAL 2 TIMES DAILY
Status: DISCONTINUED | OUTPATIENT
Start: 2023-12-30 | End: 2023-12-31

## 2023-12-30 RX ORDER — BENZONATATE 100 MG/1
200 CAPSULE ORAL 3 TIMES DAILY PRN
Status: DISCONTINUED | OUTPATIENT
Start: 2023-12-30 | End: 2023-12-31

## 2023-12-30 RX ORDER — MAGNESIUM SULFATE HEPTAHYDRATE 40 MG/ML
2 INJECTION, SOLUTION INTRAVENOUS ONCE
Status: COMPLETED | OUTPATIENT
Start: 2023-12-30 | End: 2023-12-30

## 2023-12-30 RX ORDER — SENNOSIDES 8.6 MG
17.2 TABLET ORAL NIGHTLY PRN
Status: DISCONTINUED | OUTPATIENT
Start: 2023-12-30 | End: 2023-12-31

## 2023-12-30 RX ORDER — MYCOPHENOLIC ACID 180 MG/1
360 TABLET, DELAYED RELEASE ORAL
Status: DISCONTINUED | OUTPATIENT
Start: 2023-12-30 | End: 2023-12-31

## 2023-12-30 RX ORDER — AZITHROMYCIN 250 MG/1
500 TABLET, FILM COATED ORAL DAILY
Status: DISCONTINUED | OUTPATIENT
Start: 2023-12-30 | End: 2023-12-30

## 2023-12-30 RX ORDER — IPRATROPIUM BROMIDE AND ALBUTEROL SULFATE 2.5; .5 MG/3ML; MG/3ML
3 SOLUTION RESPIRATORY (INHALATION) EVERY 6 HOURS PRN
Status: DISCONTINUED | OUTPATIENT
Start: 2023-12-30 | End: 2023-12-31

## 2023-12-30 RX ORDER — SODIUM CHLORIDE 9 MG/ML
INJECTION, SOLUTION INTRAVENOUS CONTINUOUS
Status: DISCONTINUED | OUTPATIENT
Start: 2023-12-30 | End: 2023-12-31

## 2023-12-30 RX ORDER — AZITHROMYCIN 250 MG/1
500 TABLET, FILM COATED ORAL
Status: DISCONTINUED | OUTPATIENT
Start: 2023-12-31 | End: 2023-12-31

## 2023-12-30 RX ORDER — ONDANSETRON 2 MG/ML
4 INJECTION INTRAMUSCULAR; INTRAVENOUS EVERY 6 HOURS PRN
Status: DISCONTINUED | OUTPATIENT
Start: 2023-12-30 | End: 2023-12-31

## 2023-12-30 NOTE — PROGRESS NOTES
Progress Note     Jeevan Musa    Is here was seen in the ER 2 days ago fever chills was given doxycycline and Augmentin has been taking it but short of breath today pulse ox 95% is very winded when he talks to me coughing up yellow sputum no fever today also on Coreg 6.25 twice daily for hypertension mycophenolate 360 p.o. twice daily and amlodipine 10 mg daily. Otherwise feels okay      HISTORY:  Past Medical History:   Diagnosis Date    Gross hematuria     High blood pressure     IgA nephropathy     POSSIBLE PER. NG. Renal disorder     Varicella       Past Surgical History:   Procedure Laterality Date    TRANSPLANTATION OF KIDNEY  03/2016      Social History     Tobacco Use    Smoking status: Never    Smokeless tobacco: Never   Substance Use Topics    Alcohol use: No     Alcohol/week: 0.0 standard drinks of alcohol         Medications (Active prior to today's visit):  Current Outpatient Medications   Medication Sig Dispense Refill    doxycycline 100 MG Oral Cap Take 1 capsule (100 mg total) by mouth 2 (two) times daily for 7 days. 14 capsule 0    amoxicillin clavulanate 875-125 MG Oral Tab Take 1 tablet by mouth 2 (two) times daily for 10 days. 20 tablet 0    tacrolimus 1 MG Oral Cap Take 3 capsules (3 mg total) by mouth 2 (two) times daily. 540 capsule 3    mycophenolate sodium 180 MG Oral Tab EC Take 2 tablets (360 mg total) by mouth 2 (two) times daily before meals. 360 tablet 3    carvedilol 6.25 MG Oral Tab Take 1 tablet (6.25 mg total) by mouth 2 (two) times daily with meals. 180 tablet 3    Omega-3-acid Ethyl Esters 1 g Oral Cap Take 2 capsules (2 g total) by mouth 2 (two) times daily.       AmLODIPine Besylate (NORVASC) 10 MG Oral Tab TAKE 1 TABLET BY MOUTH EVERY DAY 30 tablet 0       Allergies:  No Known Allergies      ROS:     Constitutional:  Negative for decreased activity, fever, irritability and lethargy  ENMT:  Negative for ear drainage, hearing loss and nasal drainage  Eyes: Negative for eye discharge and vision loss  Cardiovascular:  Negative for chest pain, sob  Respiratory very short of breath  Gastrointestinal:  Negative for abdominal pain, constipation  Genitourinary:  Negative for dysuria and hematuria  Endocrine:  Negative for abnormal sleep patterns  Hema/Lymph:  Negative for easy bleeding and easy bruising  Integumentary:  Negative for pruritus and rash  Musculoskeletal:  Negative for bone/joint symptoms  Neurological:  Negative for gait disturbance  Psychiatric:  Negative for inappropriate interaction and psychiatric symptoms      Vitals:    12/29/23 1644   BP: 93/57   Pulse: 102       PHYSICAL EXAM:   Constitutional: appears well hydrated alert and responsive   Head/Face: normocephalic  Eyes/Vision: normal extraocular motion is intact  Nose/Mouth/Throat:mucous membranes are moist   Neck/Thyroid: neck is supple without adenopathy  Lymphatic: no abnormal cervical, supraclavicular adenopathy is noted  Respiratory: Rhonchi left lung y  Cardiovascular: regular rate and rhythm   Abdomen: soft, non-tender, non-distended, BS normal  Skin/Hair: no unusual rashes present, no abnormal bruising noted  Back/Spine: no abnormalities noted  Musculoskeletal: no deformities  Extremities: no edema  Neurological:  Grossly normal       ASSESSMENT/PLAN:   Assessment   Encounter Diagnoses   Name Primary? Essential hypertension Yes    Renal transplant recipient     Pneumonia of left lower lobe due to infectious organism        1 pneumonia not breathing well renal transplant immunocompromised brought him down to the ER for IV fluids and admission  #2 renal transplant baseline creatinine 1 currently 1.3 will hydrate and give antibiotics    Will follow-up in the hospital         Orders This Visit:  No orders of the defined types were placed in this encounter.       Meds This Visit:  Requested Prescriptions      No prescriptions requested or ordered in this encounter       Imaging & Referrals:  None 12/29/2023  Sarah Seo MD

## 2023-12-30 NOTE — PLAN OF CARE
Patient admitted for pneumonia. Alert and oriented x4. RA. Remote tele in place. On a renal diet. No complains of pain. On IV rocephin and po azithromycin. IV fluids infusing. Respiratory flu/covid  panel collected. Ambulates independently. Call light within reach . Safety measures in place. Problem: PAIN - ADULT  Goal: Verbalizes/displays adequate comfort level or patient's stated pain goal  Description: INTERVENTIONS:  - Encourage pt to monitor pain and request assistance  - Assess pain using appropriate pain scale  - Administer analgesics based on type and severity of pain and evaluate response  - Implement non-pharmacological measures as appropriate and evaluate response  - Consider cultural and social influences on pain and pain management  - Manage/alleviate anxiety  - Utilize distraction and/or relaxation techniques  - Monitor for opioid side effects  - Notify MD/LIP if interventions unsuccessful or patient reports new pain  - Anticipate increased pain with activity and pre-medicate as appropriate  Outcome: Progressing     Problem: RISK FOR INFECTION - ADULT  Goal: Absence of fever/infection during anticipated neutropenic period  Description: INTERVENTIONS  - Monitor WBC  - Administer growth factors as ordered  - Implement neutropenic guidelines  Outcome: Progressing     Problem: SAFETY ADULT - FALL  Goal: Free from fall injury  Description: INTERVENTIONS:  - Assess pt frequently for physical needs  - Identify cognitive and physical deficits and behaviors that affect risk of falls.   - [place] fall precautions as indicated by assessment.  - Educate pt/family on patient safety including physical limitations  - Instruct pt to call for assistance with activity based on assessment  - Modify environment to reduce risk of injury  - Provide assistive devices as appropriate  - Consider OT/PT consult to assist with strengthening/mobility  - Encourage toileting schedule  Outcome: Progressing     Problem: DISCHARGE PLANNING  Goal: Discharge to home or other facility with appropriate resources  Description: INTERVENTIONS:  - Identify barriers to discharge w/pt and caregiver  - Include patient/family/discharge partner in discharge planning  - Arrange for needed discharge resources and transportation as appropriate  - Identify discharge learning needs (meds, wound care, etc)  - Arrange for interpreters to assist at discharge as needed  - Consider post-discharge preferences of patient/family/discharge partner  - Complete POLST form as appropriate  - Assess patient's ability to be responsible for managing their own health  - Refer to Case Management Department for coordinating discharge planning if the patient needs post-hospital services based on physician/LIP order or complex needs related to functional status, cognitive ability or social support system  Outcome: Progressing

## 2023-12-30 NOTE — ED QUICK NOTES
Orders for admission, patient is aware of plan and ready to go upstairs. Any questions, please call ED RN Nicolle Ayers at extension [phone].      Patient Covid vaccination status: Fully vaccinated     COVID Test Ordered in ED: None    COVID Suspicion at Admission: N/A    Running Infusions:  None    Mental Status/LOC at time of transport: AOx4    Other pertinent information:   CIWA score: N/A   NIH score:  N/A

## 2023-12-31 VITALS
WEIGHT: 162.63 LBS | DIASTOLIC BLOOD PRESSURE: 78 MMHG | TEMPERATURE: 98 F | RESPIRATION RATE: 18 BRPM | SYSTOLIC BLOOD PRESSURE: 114 MMHG | HEART RATE: 94 BPM | OXYGEN SATURATION: 99 % | BODY MASS INDEX: 23 KG/M2

## 2023-12-31 LAB
ANION GAP SERPL CALC-SCNC: 7 MMOL/L (ref 0–18)
BASOPHILS # BLD AUTO: 0.02 X10(3) UL (ref 0–0.2)
BASOPHILS NFR BLD AUTO: 0.3 %
BUN BLD-MCNC: 13 MG/DL (ref 9–23)
BUN/CREAT SERPL: 14.6 (ref 10–20)
CALCIUM BLD-MCNC: 9.5 MG/DL (ref 8.7–10.4)
CHLORIDE SERPL-SCNC: 109 MMOL/L (ref 98–112)
CO2 SERPL-SCNC: 24 MMOL/L (ref 21–32)
CREAT BLD-MCNC: 0.89 MG/DL
DEPRECATED RDW RBC AUTO: 40.6 FL (ref 35.1–46.3)
EGFRCR SERPLBLD CKD-EPI 2021: 115 ML/MIN/1.73M2 (ref 60–?)
EOSINOPHIL # BLD AUTO: 0.14 X10(3) UL (ref 0–0.7)
EOSINOPHIL NFR BLD AUTO: 2.1 %
ERYTHROCYTE [DISTWIDTH] IN BLOOD BY AUTOMATED COUNT: 12.1 % (ref 11–15)
GLUCOSE BLD-MCNC: 110 MG/DL (ref 70–99)
HCT VFR BLD AUTO: 34.3 %
HGB BLD-MCNC: 11.7 G/DL
IMM GRANULOCYTES # BLD AUTO: 0.03 X10(3) UL (ref 0–1)
IMM GRANULOCYTES NFR BLD: 0.5 %
LYMPHOCYTES # BLD AUTO: 2.21 X10(3) UL (ref 1–4)
LYMPHOCYTES NFR BLD AUTO: 33.8 %
MAGNESIUM SERPL-MCNC: 1.6 MG/DL (ref 1.6–2.6)
MCH RBC QN AUTO: 31.3 PG (ref 26–34)
MCHC RBC AUTO-ENTMCNC: 34.1 G/DL (ref 31–37)
MCV RBC AUTO: 91.7 FL
MONOCYTES # BLD AUTO: 0.57 X10(3) UL (ref 0.1–1)
MONOCYTES NFR BLD AUTO: 8.7 %
NEUTROPHILS # BLD AUTO: 3.56 X10 (3) UL (ref 1.5–7.7)
NEUTROPHILS # BLD AUTO: 3.56 X10(3) UL (ref 1.5–7.7)
NEUTROPHILS NFR BLD AUTO: 54.6 %
OSMOLALITY SERPL CALC.SUM OF ELEC: 291 MOSM/KG (ref 275–295)
PLATELET # BLD AUTO: 490 10(3)UL (ref 150–450)
POTASSIUM SERPL-SCNC: 4.3 MMOL/L (ref 3.5–5.1)
POTASSIUM SERPL-SCNC: 4.3 MMOL/L (ref 3.5–5.1)
RBC # BLD AUTO: 3.74 X10(6)UL
SODIUM SERPL-SCNC: 140 MMOL/L (ref 136–145)
WBC # BLD AUTO: 6.5 X10(3) UL (ref 4–11)

## 2023-12-31 PROCEDURE — 99239 HOSP IP/OBS DSCHRG MGMT >30: CPT | Performed by: HOSPITALIST

## 2023-12-31 RX ORDER — BENZONATATE 200 MG/1
200 CAPSULE ORAL 3 TIMES DAILY PRN
Qty: 20 CAPSULE | Refills: 0 | Status: SHIPPED | OUTPATIENT
Start: 2023-12-31

## 2023-12-31 RX ORDER — CEFPODOXIME PROXETIL 200 MG/1
200 TABLET, FILM COATED ORAL 2 TIMES DAILY
Qty: 14 TABLET | Refills: 0 | Status: SHIPPED | OUTPATIENT
Start: 2023-12-31 | End: 2023-12-31

## 2023-12-31 RX ORDER — MAGNESIUM OXIDE 400 MG/1
400 TABLET ORAL ONCE
Status: COMPLETED | OUTPATIENT
Start: 2023-12-31 | End: 2023-12-31

## 2023-12-31 NOTE — PLAN OF CARE
Pt is alert and oriented x4. On room air. Vital signs stable. Ambulating independently. Self. Stool and sputum sample obtained and sent to the lab. Neb treatments given by RT. Denies pain and nausea. Magnesium given. Safety measures in place. Will continue to monitor.      Problem: Patient Centered Care  Goal: Patient preferences are identified and integrated in the patient's plan of care  Description: Interventions:  - Provide timely, complete, and accurate information to patient/family  - Incorporate patient and family knowledge, values, beliefs, and cultural backgrounds into the planning and delivery of care  - Encourage patient/family to participate in care and decision-making at the level they choose  - Honor patient and family perspectives and choices  Outcome: Progressing     Problem: Patient/Family Goals  Goal: Patient/Family Long Term Goal  Description: Patient's Long Term Goal: Discharge home    Interventions:  - Identify barriers to discharge w/pt and caregiver  - Include patient/family/discharge partner in discharge planning  - Arrange for needed discharge resources and transportation as appropriate  - Identify discharge learning needs (meds, wound care, etc)  - Arrange for interpreters to assist at discharge as needed  - Consider post-discharge preferences of patient/family/discharge partner  - Complete POLST form as appropriate  - Assess patient's ability to be responsible for managing their own health  - Refer to Case Management Department for coordinating discharge planning if the patient needs post-hospital services based on physician/LIP order or complex needs related to functional status, cognitive ability or social support system    - See additional Care Plan goals for specific interventions  Outcome: Progressing  Goal: Patient/Family Short Term Goal  Description: Patient's Short Term Goal: Pain control    Interventions:   -Assess pain level  -Encourage pt to notify of increasing pain level  -Administer pain medication as prescribed and prn  -Provide non-pharmacological intervention  -Follow plan of care    - See additional Care Plan goals for specific interventions  Outcome: Progressing     Problem: PAIN - ADULT  Goal: Verbalizes/displays adequate comfort level or patient's stated pain goal  Description: INTERVENTIONS:  - Encourage pt to monitor pain and request assistance  - Assess pain using appropriate pain scale  - Administer analgesics based on type and severity of pain and evaluate response  - Implement non-pharmacological measures as appropriate and evaluate response  - Consider cultural and social influences on pain and pain management  - Manage/alleviate anxiety  - Utilize distraction and/or relaxation techniques  - Monitor for opioid side effects  - Notify MD/LIP if interventions unsuccessful or patient reports new pain  - Anticipate increased pain with activity and pre-medicate as appropriate  Outcome: Progressing     Problem: RISK FOR INFECTION - ADULT  Goal: Absence of fever/infection during anticipated neutropenic period  Description: INTERVENTIONS  - Monitor WBC  - Administer growth factors as ordered  - Implement neutropenic guidelines  Outcome: Progressing     Problem: SAFETY ADULT - FALL  Goal: Free from fall injury  Description: INTERVENTIONS:  - Assess pt frequently for physical needs  - Identify cognitive and physical deficits and behaviors that affect risk of falls.   - [place] fall precautions as indicated by assessment.  - Educate pt/family on patient safety including physical limitations  - Instruct pt to call for assistance with activity based on assessment  - Modify environment to reduce risk of injury  - Provide assistive devices as appropriate  - Consider OT/PT consult to assist with strengthening/mobility  - Encourage toileting schedule  Outcome: Progressing     Problem: DISCHARGE PLANNING  Goal: Discharge to home or other facility with appropriate resources  Description: INTERVENTIONS:  - Identify barriers to discharge w/pt and caregiver  - Include patient/family/discharge partner in discharge planning  - Arrange for needed discharge resources and transportation as appropriate  - Identify discharge learning needs (meds, wound care, etc)  - Arrange for interpreters to assist at discharge as needed  - Consider post-discharge preferences of patient/family/discharge partner  - Complete POLST form as appropriate  - Assess patient's ability to be responsible for managing their own health  - Refer to Case Management Department for coordinating discharge planning if the patient needs post-hospital services based on physician/LIP order or complex needs related to functional status, cognitive ability or social support system  Outcome: Progressing

## 2023-12-31 NOTE — PLAN OF CARE
Patient is alert and oriented x4. Satting 97-99% on Room air. Patient denies SOB. Denies pain. Remote tele in place. IVF infusing. Discharge order acknowledged. Discharge paperwork discussed with and given to patient. All questions answered. Patient stable at time of discharge.      Problem: Patient Centered Care  Goal: Patient preferences are identified and integrated in the patient's plan of care  Description: Interventions:  - What would you like us to know as we care for you?   - Provide timely, complete, and accurate information to patient/family  - Incorporate patient and family knowledge, values, beliefs, and cultural backgrounds into the planning and delivery of care  - Encourage patient/family to participate in care and decision-making at the level they choose  - Honor patient and family perspectives and choices  Outcome: Adequate for Discharge    Problem: PAIN - ADULT  Goal: Verbalizes/displays adequate comfort level or patient's stated pain goal  Description: INTERVENTIONS:  - Encourage pt to monitor pain and request assistance  - Assess pain using appropriate pain scale  - Administer analgesics based on type and severity of pain and evaluate response  - Implement non-pharmacological measures as appropriate and evaluate response  - Consider cultural and social influences on pain and pain management  - Manage/alleviate anxiety  - Utilize distraction and/or relaxation techniques  - Monitor for opioid side effects  - Notify MD/LIP if interventions unsuccessful or patient reports new pain  - Anticipate increased pain with activity and pre-medicate as appropriate  Outcome: Adequate for Discharge     Problem: RISK FOR INFECTION - ADULT  Goal: Absence of fever/infection during anticipated neutropenic period  Description: INTERVENTIONS  - Monitor WBC  - Administer growth factors as ordered  - Implement neutropenic guidelines  Outcome: Adequate for Discharge     Problem: SAFETY ADULT - FALL  Goal: Free from fall injury  Description: INTERVENTIONS:  - Assess pt frequently for physical needs  - Identify cognitive and physical deficits and behaviors that affect risk of falls.   - [place] fall precautions as indicated by assessment.  - Educate pt/family on patient safety including physical limitations  - Instruct pt to call for assistance with activity based on assessment  - Modify environment to reduce risk of injury  - Provide assistive devices as appropriate  - Consider OT/PT consult to assist with strengthening/mobility  - Encourage toileting schedule  Outcome: Adequate for Discharge     Problem: DISCHARGE PLANNING  Goal: Discharge to home or other facility with appropriate resources  Description: INTERVENTIONS:  - Identify barriers to discharge w/pt and caregiver  - Include patient/family/discharge partner in discharge planning  - Arrange for needed discharge resources and transportation as appropriate  - Identify discharge learning needs (meds, wound care, etc)  - Arrange for interpreters to assist at discharge as needed  - Consider post-discharge preferences of patient/family/discharge partner  - Complete POLST form as appropriate  - Assess patient's ability to be responsible for managing their own health  - Refer to Case Management Department for coordinating discharge planning if the patient needs post-hospital services based on physician/LIP order or complex needs related to functional status, cognitive ability or social support system  Outcome: Adequate for Discharge

## 2023-12-31 NOTE — DISCHARGE SUMMARY
[facility]    Discharge Summary    Irene Bee Patient Status:  Inpatient    1990 MRN [id number]   Location [facility] 4W/SW/SE Attending Henry Sousa, [address] [facility] Day # 1  Zain Del Cid MD     Date of Admission: 2023 Disposition: Home or Self Care     Date of Discharge: 2023      Admitting Diagnosis: Community acquired pneumonia, unspecified laterality [J18.9]    Hospital Discharge Diagnoses:  [address] Discharge Diagnoses:  cap    Lace+ Score: 30  59-90 High Risk  29-58 Medium Risk  0-28   Low Risk. TCM Follow-Up Recommendation:  LACE 29-58: Moderate Risk of readmission after discharge from the hospital.          Lace+ Score: 30  59-90 High Risk  29-58 Medium Risk  0-28   Low Risk    Risk of readmission: Irene Bee has Moderate Risk of readmission after discharge from the hospital.    Problem List:   Patient Active Problem List   Diagnosis    Essential hypertension    Renal transplant recipient    Lipoma of torso    Lesion of subcutaneous tissue    Community acquired pneumonia, unspecified laterality       Reason for Admission: cough and sob     Physical Exam:   General appearance: alert, appears stated age and cooperative  Pulmonary:  clear to auscultation bilaterally  Cardiovascular: S1, S2 normal, no murmur, click, rub or gallop, regular rate and rhythm  Abdominal: soft, non-tender; bowel sounds normal; no masses,  no organomegaly  Extremities: extremities normal, atraumatic, no cyanosis or edema  Psychiatric: calm        History of Present Illness:     Per Dr Marlene Tate is a(n) 26year old male, who presents for evaluation of worsening symptoms of cough and he was actually advised to come to the ER by his nephrologist Dr. Ike Del Cid earlier today. Patient reports that he has been having productive cough of yellow phlegm for the past month and is now getting worse.   He does have significant past medical history of IgA nephropathy s/p left renal transplant (on tacrolimus/mycophenolate), essential hypertension. Patient reports productive cough for the last month and was actually seen at [facility] ED 2 days prior to presentation, diagnosed with pneumonia sent home on doxycycline/Augmentin. Patient reports taking the antibiotic without any improvement of symptoms. He reports shortness of breath on exertion. Reports that he is not able to eat or drink anything due to not feeling well. Denies chest pain. Reports taking his antisuppressive medications. He is also reporting ongoing nonbloody diarrhea for the last 10 days. Reports having diarrhea about 10 times per day. Denies lightheadedness or dizziness. In the ED, initial vital signs reveal temp 99, heart rate 102, respiratory rate 24, blood pressure 101/64, SpO2 97% on room air. Lab work reveals potassium level 3.3, sodium level 135, hemoglobin 11.7. Patient admitted under hospitalist service with consultation to nephrology Dr. Bekah Arrington notified. Hospital Course:       Community-acquired pneumonia -initially diagnosed 2 days prior to current admission, not responding to outpatient oral antibiotics with doxycycline/Augmentin. Worsening symptoms of productive cough/shortness of breath. Patient is immunocompromised, renal transplant recipient. Not hypoxic, SpO2 above 90% on room air. Afebrile. Chest x-ray with concern for pneumonia  - cont iv abx and add nebs prn - much better today - transition to augmentin and doxy which he already has at home   - resp panel negative, strep pneumo ag neg, c diff neg   - repeat CXR outpt      IgA nephropathy s/p left renal transplant -follows with nephrology, Dr. Bekah Arrington. On tacrolimus /mycophenolate. Renal function appears stable  -Resume home dose of tacrolimus/mycophenolate.      Essential hypertension -controlled  -Resume amlodipine and Coreg     Prophylaxis  Subcutaneous heparin     CODE STATUS  Full Audrey Giraldo DO         Consultations: Dr Byron Hong     Procedures: none    Complications: none    Discharge Condition: Good    Discharge Medications:      Discharge Medications        START taking these medications        Instructions Prescription details   benzonatate 200 MG Caps  Commonly known as: Tessalon      Take 1 capsule (200 mg total) by mouth 3 (three) times daily as needed for cough. Quantity: 20 capsule  Refills: 0            CONTINUE taking these medications        Instructions Prescription details   amLODIPine 10 MG Tabs  Commonly known as: Norvasc      TAKE 1 TABLET BY MOUTH EVERY DAY   Quantity: 30 tablet  Refills: 0     amoxicillin clavulanate 875-125 MG Tabs  Commonly known as: Augmentin      Take 1 tablet by mouth 2 (two) times daily for 10 days. Stop taking on: January 6, 2024  Quantity: 20 tablet  Refills: 0     carvedilol 6.25 MG Tabs  Commonly known as: Coreg      Take 1 tablet (6.25 mg total) by mouth 2 (two) times daily with meals. Quantity: 180 tablet  Refills: 3     doxycycline 100 MG Caps  Commonly known as: Vibramycin      Take 1 capsule (100 mg total) by mouth 2 (two) times daily for 7 days. Stop taking on: January 3, 2024  Quantity: 14 capsule  Refills: 0     mycophenolate sodium 180 MG Tbec  Commonly known as: Myfortic      Take 2 tablets (360 mg total) by mouth 2 (two) times daily before meals. Quantity: 360 tablet  Refills: 3     omega-3-acid ethyl esters 1 g Caps  Commonly known as: Lovaza      Take 2 capsules (2 g total) by mouth 2 (two) times daily. Refills: 0     tacrolimus 1 MG Caps  Commonly known as: Prograf      Take 3 capsules (3 mg total) by mouth 2 (two) times daily.    Quantity: 540 capsule  Refills: 3            STOP taking these medications      Polyethylene Glycol 3350 17 g Pack  Commonly known as: MiraLax                  Where to Get Your Medications        These medications were sent to [address] [place] [address], [address] [place] [address] [address], [phone], [address] [id number]      Hours: 24-hours Phone: [phone]   benzonatate 200 MG Caps         Follow up Visits:  Follow-up with pcp Dr Dao Avila  in 1 week    Follow up Labs: none     Other Discharge Instructions: none    Taylor Charles DO  12/31/2023  1:45 PM    > 35 min

## 2024-01-02 ENCOUNTER — PATIENT OUTREACH (OUTPATIENT)
Dept: CASE MANAGEMENT | Age: 34
End: 2024-01-02

## 2024-01-02 DIAGNOSIS — Z02.9 ENCOUNTERS FOR UNSPECIFIED ADMINISTRATIVE PURPOSE: Primary | ICD-10-CM

## 2024-01-02 NOTE — PROGRESS NOTES
Left message on mailbox for pt to call [facility] back for TCM.  [facility] contact information [phone] included in message.        Discharge Dx:   Community acquired pneumonia     Follow up appointments:    Follow-up Information    Follow up With Specialties Details Why Contact Info   Jerome Mendez MD NEPHROLOGY, Internal Medicine Follow up in 1 week(s) send Dr. Mendez a message on Prized to schedule a video follow-up appointment Glenda [place] [facility] [id number] [phone]

## 2024-01-04 NOTE — PAYOR COMM NOTE
--------------  ADMISSION REVIEW     Payor: [employer]  Subscriber #:  [id number]  Authorization Number: [id number]    Admit date: 12/30/23  Admit time:  1:06 AM       REVIEW DOCUMENTATION:     ED Provider Notes          Patient Seen in: [facility] Emergency Department      History     Chief Complaint   Patient presents with    Pneumonia     Stated Complaint: Difficulty breathing    Subjective:   32yo/m w hx of renal transplant reports to the ED 2 days after being seen by this provider for bilateral pneumonia. Worsening cough, exertional dyspnea. Taking all meds. Reduced po intake. He had a 2 day f/u with Dr. Mendez today who recommended admission for IV fluid and IV antibiotics. No syncope. No chest pain. No vomiting. Feels malaise, more difficulty completing ADLs      Past Surgical History:   Procedure Laterality Date    TRANSPLANTATION OF KIDNEY  03/2016       Review of Systems   All other systems reviewed and are negative.      Positive for stated complaint: Difficulty breathing  Other systems are as noted in HPI.  Constitutional and vital signs reviewed.      All other systems reviewed and negative except as noted above.    Physical Exam     ED Triage Vitals [12/29/23 1727]   /64   Pulse 102   Resp 24   Temp 99 °F (37.2 °C)   Temp src Oral   SpO2 97 %   O2 Device None (Room air)       Current:/76   Pulse 90   Temp 99 °F (37.2 °C) (Oral)   Resp 24   SpO2 96%         Physical Exam  Vitals and nursing note reviewed.   Constitutional:       General: He is not in acute distress.     Appearance: He is well-developed.   HENT:      Head: Normocephalic and atraumatic.      Nose: Nose normal.      Mouth/Throat:      Mouth: Mucous membranes are moist.   Eyes:      Conjunctiva/sclera: Conjunctivae normal.      Pupils: Pupils are equal, round, and reactive to light.   Cardiovascular:      Rate and Rhythm: Normal rate and regular rhythm.      Heart sounds: Normal heart sounds.   Pulmonary:       Effort: Pulmonary effort is normal.      Breath sounds: Rhonchi present.   Abdominal:      General: Bowel sounds are normal.      Palpations: Abdomen is soft.   Musculoskeletal:         General: No tenderness or deformity. Normal range of motion.      Cervical back: Normal range of motion and neck supple.   Skin:     General: Skin is warm and dry.      Capillary Refill: Capillary refill takes less than 2 seconds.      Findings: No rash.      Comments: Normal color   Neurological:      General: No focal deficit present.      Mental Status: He is alert and oriented to person, place, and time.      GCS: GCS eye subscore is 4. GCS verbal subscore is 5. GCS motor subscore is 6.      Cranial Nerves: No cranial nerve deficit.      Gait: Gait normal.               ED Course     Labs Reviewed   COMP METABOLIC PANEL (14) - Abnormal; Notable for the following components:       Result Value    Glucose 106 (*)     Sodium 135 (*)     Potassium 3.3 (*)     BUN/CREA Ratio 9.4 (*)     Total Protein 8.3 (*)     All other components within normal limits   PROCALCITONIN - Abnormal; Notable for the following components:    Procalcitonin 0.60 (*)     All other components within normal limits   CBC W/ DIFFERENTIAL - Abnormal; Notable for the following components:    RBC 3.76 (*)     HGB 11.7 (*)     HCT 33.9 (*)     .0 (*)     Monocyte Absolute 1.06 (*)     All other components within normal limits   CBC WITH DIFFERENTIAL WITH PLATELET    Narrative:     The following orders were created for panel order CBC With Differential With Platelet.  Procedure                               Abnormality         Status                     ---------                               -----------         ------                     CBC W/ DIFFERENTIAL[[id number]]          Abnormal            Final result                 Please view results for these tests on the individual orders.     EKG    Rate, intervals and axes as noted on EKG Report.  Rate:  90   Rhythm: Sinus Rhythm  Reading: NSR no lawanda no ectopy  Normal axis  Stable clinical condition  No comparison      Admission disposition: 12/29/2023 10:55 PM    Problems Addressed:  Community acquired pneumonia, unspecified laterality: acute illness or injury    Amount and/or Complexity of Data Reviewed  Labs:  Decision-making details documented in ED Course.  Radiology:  Decision-making details documented in ED Course.    Risk  OTC drugs.  Prescription drug management.        Disposition and Plan     Clinical Impression:  1. Community acquired pneumonia, unspecified laterality         Disposition:  Admit  12/29/2023 10:55 pm    Hospital Problems       Present on Admission  Date Reviewed: 12/29/2023            ICD-10-CM Noted POA    * (Principal) Community acquired pneumonia, unspecified laterality J18.9 12/29/2023 Unknown              Attestation signed by Carlos Cedillo MD at 12/30/2023  9:46 AM    I reviewed that chart and discussed the case.     I agree with the following clinical impression(s):  Community acquired pneumonia, unspecified laterality  (primary encounter diagnosis).     I agree with the plan as noted.          Signed by Carlos Cedillo MD on 12/30/2023  9:46 AM            H&P - H&P Note      Date:  12/29/2023  Date of Admission:  12/29/2023    Chief Complaint:  Chief Complaint   Patient presents with    Pneumonia       History of Present Illness:  Raúl Ly is a(n) 33 year old male, who presents for evaluation of worsening symptoms of cough and he was actually advised to come to the ER by his nephrologist Dr. Mendez earlier today.  Patient reports that he has been having productive cough of yellow phlegm for the past month and is now getting worse.  He does have significant past medical history of IgA nephropathy s/p left renal transplant (on tacrolimus/mycophenolate), essential hypertension.  Patient reports productive cough for the last month and was actually seen at [place] ED 2  days prior to presentation, diagnosed with pneumonia sent home on doxycycline/Augmentin.  Patient reports taking the antibiotic without any improvement of symptoms.  He reports shortness of breath on exertion.  Reports that he is not able to eat or drink anything due to not feeling well.  Denies chest pain.  Reports taking his antisuppressive medications.  He is also reporting ongoing nonbloody diarrhea for the last 10 days.  Reports having diarrhea about 10 times per day.  Denies lightheadedness or dizziness.  In the ED, initial vital signs reveal temp 99, heart rate 102, respiratory rate 24, blood pressure 101/64, SpO2 97% on room air.  Lab work reveals potassium level 3.3, sodium level 135, hemoglobin 11.7.  Patient admitted under hospitalist service with consultation to nephrology Dr. Mendez notified.     Review of Systems:  Constitutional:  + Generalized weakness  Eye:  Negative.  Ear/Nose/Mouth/Throat:  Negative.  Respiratory:  Negative  Cardiovascular: Negative  Gastrointestinal:  Negative.  Genitourinary:  Negative  Endocrine:  Negative.  Immunologic:  Negative.  Musculoskeletal:  Negative.  Integumentary:  Negative.  Neurologic:  Negative.  Psychiatric:  Negative.  ROS reviewed as documented in chart    Physical Exam:  Temp:  [99 °F (37.2 °C)] 99 °F (37.2 °C)  Pulse:  [] 95  Resp:  [24] 24  BP: ()/(57-79) 102/64  SpO2:  [95 %-97 %] 95 %    General:  Alert and oriented.  Acutely ill-appearing  Diffuse skin problem:  None.  Eye:  Pupils are equal, round and reactive to light, extraocular movements are intact, Normal conjunctiva.  HENT:  Normocephalic, oral mucosa is moist.  Head:  Normocephalic, atraumatic.  Neck:  Supple, non-tender, no carotid bruit, no jugular venous distention, no lymphadenopathy, no thyromegaly.  Respiratory:  Lungs are clear to auscultation, respirations are non-labored, breath sounds are equal, symmetrical chest wall expansion.  Cardiovascular:  Normal rate, regular rhythm,  no murmur, no edema.  Gastrointestinal:  Soft, non-tender, non-distended, normal bowel sounds, no organomegaly.  Lymphatics:  No lymphadenopathy neck, axilla, groin.  Musculoskeletal: Normal range of motion.  normal strength.  Feet:  Normal pulses.  Neurologic:  Alert, oriented, no focal deficits, cranial nerves II-XII are grossly intact.  Cognition and Speech:  Oriented, speech clear and coherent.  Psychiatric:  Cooperative, appropriate mood & affect.      Laboratory Data:   Lab Results   Component Value Date    WBC 10.7 12/29/2023    HGB 11.7 12/29/2023    HCT 33.9 12/29/2023    .0 12/29/2023    CREATSERUM 1.27 12/29/2023    BUN 12 12/29/2023     12/29/2023    K 3.3 12/29/2023     12/29/2023    CO2 22.0 12/29/2023     12/29/2023    CA 9.6 12/29/2023    ALB 4.2 12/29/2023    ALKPHO 74 12/29/2023    BILT 0.7 12/29/2023    TP 8.3 12/29/2023    AST 24 12/29/2023    ALT 31 12/29/2023       Imaging:  XR CHEST AP PORTABLE  (CPT=71045)    Result Date: 12/29/2023  CONCLUSION:   Streaky/patchy left greater than right basilar opacities are unchanged since examination from 2 days prior.  Given clinical context, findings are concerning for pneumonia.   Dictated by (CST): Milan Kessler MD on 12/29/2023 at 6:30 PM     Finalized by (CST): Milan Kessler MD on 12/29/2023 at 6:32 PM            Assessment and Plan:    Community-acquired pneumonia -initially diagnosed 2 days prior to current admission, not responding to outpatient oral antibiotics with doxycycline/Augmentin.  Worsening symptoms of productive cough/shortness of breath.  Patient is immunocompromised, renal transplant recipient.  Not hypoxic, SpO2 above 90% on room air.  Afebrile. Chest x-ray with concern for pneumonia  -Initiate antibiotics with Rocephin IV daily as well as Z-Andrew.  Continue antibiotics, pending sputum culture, Legionella, strep antigen to narrow down antibiotic coverage.  -Defer obtaining CT chest at this time but if  condition worsens, consider CT chest as well as pulmonology consult.     IgA nephropathy s/p left renal transplant -follows with nephrology, Dr. Mendez.  On tacrolimus /mycophenolate. Renal function appears stable  -Resume home dose of tacrolimus/mycophenolate.    Essential hypertension -controlled  -Resume amlodipine and Coreg    Prophylaxis  Subcutaneous heparin    CODE STATUS  Full    Primary care physician  Jerome Mendez MD    60 minutes spent on this admission - examining patient, obtaining history, reviewing previous medical records, going over test results/imaging and discussing plan of care. All questions answered.     Disposition  Clinical course will dictate outcome      Christiana Roman MD  12/29/2023  11:34 PM       Electronically signed by Christiana Roman MD on 12/30/2023  6:22 AM         CONSULT  Impression:          Patient Active Problem List   Diagnosis    Essential hypertension    Renal transplant recipient    Lipoma of torso    Lesion of subcutaneous tissue    Community acquired pneumonia, unspecified laterality         Recommendations:     #1 pneumonia immunocompromised doing better  On Zithromax and Rocephin most likely can go home tomorrow if doing okay pulse ox 98%     #2 renal transplant doing great continue present meds     Thank you will follow  Thank you for allowing me to participate in the care of your patient.     Jerome Mendez MD  12/30/2023 12/30  Chief complaint pna      Subjective:   Raúl Ly is a(n) 33 year old male Pt about the same. Sob with coughing      ROS:   No cp, sob   No c/d   No n/v      Objective:   Blood pressure 112/73, pulse 92, temperature 98.1 °F (36.7 °C), temperature source Oral, resp. rate 18, weight 162 lb 9.6 oz (73.8 kg), SpO2 99%.        Intake/Output Summary (Last 24 hours) at 12/30/2023 1502  Last data filed at 12/30/2023 0600      Gross per 24 hour   Intake 565 ml   Output 425 ml   Net 140 ml         Patient Weight(s)  for the past 336 hrs:    Weight   12/30/23 0110 162 lb 9.6 oz (73.8 kg)               General appearance: alert, appears stated age and cooperative  Pulmonary:  clear to auscultation bilaterally  Cardiovascular: S1, S2 normal, no murmur, click, rub or gallop, regular rate and rhythm  Abdominal: soft, non-tender; bowel sounds normal; no masses,  no organomegaly  Extremities: extremities normal, atraumatic, no cyanosis or edema           Medicines:             Current Facility-Administered Medications   Medication Dose Route Frequency    heparin (Porcine) 5000 UNIT/ML injection 5,000 Units  5,000 Units Subcutaneous 2 times per day    acetaminophen (Tylenol Extra Strength) tab 500 mg  500 mg Oral Q4H PRN    polyethylene glycol (PEG 3350) (Miralax) 17 g oral packet 17 g  17 g Oral Daily PRN    sennosides (Senokot) tab 17.2 mg  17.2 mg Oral Nightly PRN    bisacodyl (Dulcolax) 10 MG rectal suppository 10 mg  10 mg Rectal Daily PRN    fleet enema (Fleet) 7-19 GM/118ML rectal enema 133 mL  1 enema Rectal Once PRN    ondansetron (Zofran) 4 MG/2ML injection 4 mg  4 mg Intravenous Q6H PRN    prochlorperazine (Compazine) 10 MG/2ML injection 5 mg  5 mg Intravenous Q8H PRN    benzonatate (Tessalon) cap 200 mg  200 mg Oral TID PRN    guaiFENesin (Robitussin) 100 MG/5 ML oral liquid 200 mg  200 mg Oral Q4H PRN    cefTRIAXone (Rocephin) 1 g in D5W 100 mL IVPB-ADD  1 g Intravenous Q24H    amLODIPine (Norvasc) tab 10 mg  10 mg Oral Daily    carvedilol (Coreg) tab 6.25 mg  6.25 mg Oral BID with meals    mycophenolate sodium (Myfortic) DR tab 360 mg  360 mg Oral BID AC    tacrolimus (Prograf) cap 3 mg  3 mg Oral BID    [START ON 12/31/2023] azithromycin (Zithromax) tab 500 mg  500 mg Oral Daily    ipratropium-albuterol (Duoneb) 0.5-2.5 (3) MG/3ML inhalation solution 3 mL  3 mL Nebulization Q6H PRN               Lab Results   Component Value Date     WBC 7.2 12/30/2023     HGB 11.1 (L) 12/30/2023     HCT 33.0 (L) 12/30/2023     .0  12/30/2023     CREATSERUM 1.03 12/30/2023     BUN 13 12/30/2023      12/30/2023     K 3.7 12/30/2023      12/30/2023     CO2 22.0 12/30/2023      (H) 12/30/2023     CA 9.4 12/30/2023     ALB 4.2 12/29/2023     ALKPHO 74 12/29/2023     BILT 0.7 12/29/2023     TP 8.3 (H) 12/29/2023     AST 24 12/29/2023     ALT 31 12/29/2023     INR 1.0 11/14/2014     PT 13.0 11/14/2014     TSH 1.06 09/23/2013     CRP < 0.5 10/11/2013     MG 1.1 (LL) 12/29/2023         XR CHEST AP PORTABLE  (CPT=71045)     Result Date: 12/29/2023  CONCLUSION:          Streaky/patchy left greater than right basilar opacities are unchanged since examination from 2 days prior.  Given clinical context, findings are concerning for pneumonia.   Dictated by (CST): Milan Kessler MD on 12/29/2023 at 6:30 PM     Finalized by (CST): Milan Kessler MD on 12/29/2023 at 6:32 PM         EKG 12 Lead     Result Date: 12/30/2023  Normal sinus rhythm Nonspecific ST and T wave abnormality Abnormal ECG No previous ECGs found in [facility] Confirmed by HALLE LEACH STEVEN ([id number]) on 12/30/2023 1:39:22 PM      Results:      CBC:          Lab Results   Component Value Date     WBC 7.2 12/30/2023     WBC 10.7 12/29/2023     WBC 13.2 (H) 12/27/2023            Lab Results   Component Value Date     HGB 11.1 (L) 12/30/2023     HGB 11.7 (L) 12/29/2023     HGB 12.3 (L) 12/27/2023            Lab Results   Component Value Date     .0 12/30/2023     .0 (H) 12/29/2023     .0 12/27/2023               Recent Labs   Lab 12/27/23  1500 12/29/23  1916 12/30/23  0653   * 106* 116*   BUN 16 12 13   CREATSERUM 1.36* 1.27 1.03   CA 9.7 9.6 9.4   * 135* 140   K 3.7 3.3* 3.7    106 110   CO2 21.0 22.0 22.0                  Assessment and Plan:        Community-acquired pneumonia -initially diagnosed 2 days prior to current admission, not responding to outpatient oral antibiotics with doxycycline/Augmentin.  Worsening symptoms of productive  cough/shortness of breath.  Patient is immunocompromised, renal transplant recipient.  Not hypoxic, SpO2 above 90% on room air.  Afebrile. Chest x-ray with concern for pneumonia  - cont iv abx and add nebs prn   - follow sputum culture, Legionella, strep antigen  -Defer obtaining CT chest at this time but if condition worsens, consider CT chest as well as pulmonology consult.      IgA nephropathy s/p left renal transplant -follows with nephrology, Dr. Mendez.  On tacrolimus /mycophenolate. Renal function appears stable  -Resume home dose of tacrolimus/mycophenolate.     Essential hypertension -controlled  -Resume amlodipine and Coreg     Prophylaxis  Subcutaneous heparin     CODE STATUS  Full      Catalina Castaneda,          [facility]           2023 DISCHARGED  Discharge Summary           Raúl Ly Patient Status:  Inpatient    1990 MRN [id number]   Location [facility] 4W/SW/SE Attending Catalina Castaneda DO   Hosp Day # 1 PCP Jerome Mendez MD      Date of Admission: 2023 Disposition: Home or Self Care      Date of Discharge: 2023        Admitting Diagnosis: Community acquired pneumonia, unspecified laterality [J18.9]     Hospital Discharge Diagnoses:  cap     Hospital Discharge Diagnoses:  cap     Lace+ Score: 30  59-90 High Risk  29-58 Medium Risk  0-28   Low Risk.     TCM Follow-Up Recommendation:  LACE 29-58: Moderate Risk of readmission after discharge from the hospital.              Lace+ Score: 30  59-90 High Risk  29-58 Medium Risk  0-28   Low Risk     Risk of readmission: Raúl Ly has Moderate Risk of readmission after discharge from the hospital.     Problem List:       Patient Active Problem List   Diagnosis    Essential hypertension    Renal transplant recipient    Lipoma of torso    Lesion of subcutaneous tissue    Community acquired pneumonia, unspecified laterality         Reason for Admission: cough and sob      Physical Exam:    General appearance: alert, appears stated age and cooperative  Pulmonary:  clear to auscultation bilaterally  Cardiovascular: S1, S2 normal, no murmur, click, rub or gallop, regular rate and rhythm  Abdominal: soft, non-tender; bowel sounds normal; no masses,  no organomegaly  Extremities: extremities normal, atraumatic, no cyanosis or edema  Psychiatric: calm           History of Present Illness:      Per Dr Abel Olsen Jerome Ly is a(n) 33 year old male, who presents for evaluation of worsening symptoms of cough and he was actually advised to come to the ER by his nephrologist Dr. Mendez earlier today.  Patient reports that he has been having productive cough of yellow phlegm for the past month and is now getting worse.  He does have significant past medical history of IgA nephropathy s/p left renal transplant (on tacrolimus/mycophenolate), essential hypertension.  Patient reports productive cough for the last month and was actually seen at [place] [facility] 2 days prior to presentation, diagnosed with pneumonia sent home on doxycycline/Augmentin.  Patient reports taking the antibiotic without any improvement of symptoms.  He reports shortness of breath on exertion.  Reports that he is not able to eat or drink anything due to not feeling well.  Denies chest pain.  Reports taking his antisuppressive medications.  He is also reporting ongoing nonbloody diarrhea for the last 10 days.  Reports having diarrhea about 10 times per day.  Denies lightheadedness or dizziness.  In the ED, initial vital signs reveal temp 99, heart rate 102, respiratory rate 24, blood pressure 101/64, SpO2 97% on room air.  Lab work reveals potassium level 3.3, sodium level 135, hemoglobin 11.7.  Patient admitted under hospitalist service with consultation to nephrology Dr. Mendez notified.       Hospital Course:        Community-acquired pneumonia -initially diagnosed 2 days prior to current admission, not responding to outpatient  oral antibiotics with doxycycline/Augmentin.  Worsening symptoms of productive cough/shortness of breath.  Patient is immunocompromised, renal transplant recipient.  Not hypoxic, SpO2 above 90% on room air.  Afebrile. Chest x-ray with concern for pneumonia  - cont iv abx and add nebs prn - much better today - transition to augmentin and doxy which he already has at home   - resp panel negative, strep pneumo ag neg, c diff neg   - repeat CXR outpt      IgA nephropathy s/p left renal transplant -follows with nephrology, Dr. Mendez.  On tacrolimus /mycophenolate. Renal function appears stable  -Resume home dose of tacrolimus/mycophenolate.     Essential hypertension -controlled  -Resume amlodipine and Coreg     Prophylaxis  Subcutaneous heparin     CODE STATUS  Full              Catalina Castaneda, DO           Consultations: Dr Mendez      Procedures: none     Complications: none     Discharge Condition: Good     Discharge Medications:       Discharge Medications          START taking these medications         Instructions Prescription details   benzonatate 200 MG Caps  Commonly known as: Tessalon       Take 1 capsule (200 mg total) by mouth 3 (three) times daily as needed for cough.    Quantity: 20 capsule  Refills: 0                CONTINUE taking these medications         Instructions Prescription details   amLODIPine 10 MG Tabs  Commonly known as: Norvasc       TAKE 1 TABLET BY MOUTH EVERY DAY    Quantity: 30 tablet  Refills: 0      amoxicillin clavulanate 875-125 MG Tabs  Commonly known as: Augmentin       Take 1 tablet by mouth 2 (two) times daily for 10 days.    Stop taking on: January 6, 2024  Quantity: 20 tablet  Refills: 0      carvedilol 6.25 MG Tabs  Commonly known as: Coreg       Take 1 tablet (6.25 mg total) by mouth 2 (two) times daily with meals.    Quantity: 180 tablet  Refills: 3      doxycycline 100 MG Caps  Commonly known as: Vibramycin       Take 1 capsule (100 mg total) by mouth 2 (two) times daily  for 7 days.    Stop taking on: January 3, 2024  Quantity: 14 capsule  Refills: 0      mycophenolate sodium 180 MG Tbec  Commonly known as: Myfortic       Take 2 tablets (360 mg total) by mouth 2 (two) times daily before meals.    Quantity: 360 tablet  Refills: 3      omega-3-acid ethyl esters 1 g Caps  Commonly known as: Lovaza       Take 2 capsules (2 g total) by mouth 2 (two) times daily.    Refills: 0      tacrolimus 1 MG Caps  Commonly known as: Prograf       Take 3 capsules (3 mg total) by mouth 2 (two) times daily.    Quantity: 540 capsule  Refills: 3                STOP taking these medications       Polyethylene Glycol 3350 17 g Pack  Commonly known as: MiraLax                       Where to Get Your Medications          These medications were sent to [id number] - [place], [place] - [address] AT [facility] [address], [phone], [phone] [address] [place] [address]        Hours: 24-hours Phone: [phone]   benzonatate 200 MG Caps            Follow up Visits: Follow-up with pcp Dr Mendez  in 1 week     Follow up Labs: none      Other Discharge Instructions: none     Catalina Castaneda DO  12/31/2023  1:45 PM     > 35 min         Signed by Catalina Castaneda DO on 12/31/2023  1:47 PM    Vitals (last day) before discharge       Date/Time Temp Pulse Resp BP SpO2 Weight O2 Device O2 Flow Rate (L/min) Who    12/31/23 1205 -- 94 18 114/78 99 % -- None (Room air) -- KA    12/31/23 0832 -- 93 -- 116/78 97 % -- None (Room air) -- LK    12/31/23 0559 97.9 °F (36.6 °C) 90 22 105/68 97 % -- None (Room air) -- MN    12/30/23 2138 98 °F (36.7 °C) -- 18 112/71 99 % -- None (Room air) -- RG    12/30/23 1900 -- 91 -- -- -- -- -- -- CY    12/30/23 1338 98.1 °F (36.7 °C) -- 18 112/73 99 % -- -- -- GH    12/30/23 0445 97.8 °F (36.6 °C) 92 18 120/73 96 % -- None (Room air) -- RG    12/30/23 0115 -- 88 -- -- -- -- -- -- GT    12/30/23 0110 98.2 °F (36.8 °C) 92 18 116/73 96 % 162 lb 9.6 oz None (Room  air) -- MJ    12/30/23 0030 -- 85 -- 108/68 96 % -- None (Room air) -- ER    12/30/23 0000 -- 90 -- 109/76 96 % -- None (Room air) -- ER              Medication Administration Report  for Raúl Ly as of 12/22/23 through 12/31/23  Legend:       Medications 12/22 12/23 12/24 12/25 12/26 12/27 12/28 12/29 12/30 12/31   Completed Medications   amLODIPine (Norvasc) tab 10 mg  Dose: 10 mg  Freq: Once Route: OR  Start: 12/29/23 2313 End: 12/29/23 2330           [id number]          azithromycin (Zithromax) 500 mg in sodium chloride 0.9% 250mL IVPB premix  Dose: 500 mg  Freq: Once Route: IV  Start: 12/30/23 0037 End: 12/30/23 0247   Admin Instructions:   Consider alternative antibiotic if baseline QTc >500 ms   Order specific questions:               [id number]         carvedilol (Coreg) tab 6.25 mg  Dose: 6.25 mg  Freq: Once Route: OR  Start: 12/29/23 2309 End: 12/29/23 2329           08553          cefTRIAXone (Rocephin) 1 g in D5W 100 mL IVPB-ADD  Dose: 1 g  Freq: Once Route: IV  Start: 12/30/23 0037 End: 12/30/23 0114   Admin Instructions:   Ceftriaxone must NOT be administered simultaneously with calcium containing IV solutions. Includes Y-site as well.  In patients other than neonates ceftriaxone and calcium containing products may administered sequentially, provided the line is flushed in between administrations.   Order specific questions:               [id number]     01411         magnesium oxide (Mag-Ox) tab 400 mg  Dose: 400 mg  Freq: Once Route: OR  Start: 12/31/23 0815 End: 12/31/23 0835             20051        magnesium oxide (Mag-Ox) tab 800 mg  Dose: 800 mg  Freq: Once Route: OR  Start: 12/30/23 0430 End: 12/30/23 0453            04881         magnesium sulfate in sterile water for injection 2 g/50mL IVPB premix 2 g  Dose: 2 g  Freq: Once Route: IV  Start: 12/30/23 2145 End: 12/30/23 2313            31923         mycophenolate sodium (Myfortic) DR tab 180 mg  Dose: 180 mg  Freq: Once Route: OR  Start:  12/29/23 2309 End: 12/29/23 2330   Admin Instructions:   Give on an empty stomach.  CAUTION: HAZARDOUS DRUG           [id number]          potassium chloride 40 mEq in 250mL sodium chloride 0.9% IVPB premix  Dose: 40 mEq  Freq: Once Route: IV  Start: 12/30/23 0500 End: 12/30/23 0853            [id number]         tacrolimus (Prograf) cap 1 mg  Dose: 1 mg  Freq: Once Route: OR  Start: 12/29/23 2309 End: 12/29/23 2329   Admin Instructions:   CAUTION: HAZARDOUS DRUG           [id number]          Discontinued Medications   Medications 12/22 12/23 12/24 12/25 12/26 12/27 12/28 12/29 12/30 12/31   acetaminophen (Tylenol Extra Strength) tab 500 mg  Dose: 500 mg  Freq: Every 4 hours PRN Route: OR  PRN Reason: Fever  PRN Comment: equal to or greater than 100.4  Start: 12/30/23 0110 End: 12/31/23 1840   Admin Instructions:   do not initiate oral therapy until 6-8 hours after the last IV acetaminophen dose if IV acetaminophen was used previously                amLODIPine (Norvasc) tab 10 mg  Dose: 10 mg  Freq: Daily Route: OR  Start: 12/30/23 0930 End: 12/31/23 1840            [id number]      [id number]         azithromycin (Zithromax) tab 500 mg  Dose: 500 mg  Freq: Daily Route: OR  Start: 12/30/23 0115 End: 12/30/23 0142   Admin Instructions:   500mg on day one followed by 250mg daily x 4 days  Consider alternative antibiotic if baseline QTc >500 ms   Order specific questions:               [phone]         Followed by  azithromycin (Zithromax) tab 250 mg  Dose: 250 mg  Freq: Daily Route: OR  Start: 12/30/23 0930 End: 12/30/23 0142   Admin Instructions:   Consider alternative antibiotic if baseline QTc >500 ms   Order specific questions:                   azithromycin (Zithromax) tab 500 mg  Dose: 500 mg  Freq: Daily Route: OR  Start: 12/31/23 0500 End: 12/31/23 1840   Admin Instructions:   Consider alternative antibiotic if baseline QTc >500 ms   Order specific questions:                [id number]        benzonatate (Tessalon) cap 200 mg  Dose: 200  mg  Freq: 3 times daily PRN Route: OR  PRN Reason: cough  Start: 12/30/23 0110 End: 12/31/23 1840   Admin Instructions:   Do not crush                bisacodyl (Dulcolax) 10 MG rectal suppository 10 mg  Dose: 10 mg  Freq: Daily as needed Route: RE  PRN Reason: constipation  Start: 12/30/23 0110 End: 12/31/23 1840   Admin Instructions:   Use after MiraLAX and Senokot.  Use prior to Fleet's Enema.                carvedilol (Coreg) tab 6.25 mg  Dose: 6.25 mg  Freq: 2 times daily with meals Route: OR  Start: 12/30/23 0800 End: 12/31/23 1840   Admin Instructions:   Hold for SBP < 110 and/or HR < 60            [id number]     [id number]      265639     19        cefTRIAXone (Rocephin) 1 g in D5W 100 mL IVPB-ADD  Dose: 1 g  Freq: Every 24 hours Route: IV  Start: 12/30/23 2300 End: 12/31/23 1840   Admin Instructions:   Ceftriaxone must NOT be administered simultaneously with calcium containing IV solutions. Includes Y-site as well.  In patients other than neonates ceftriaxone and calcium containing products may administered sequentially, provided the line is flushed in between administrations.   Order specific questions:                [id number]        fleet enema (Fleet) 7-19 GM/118ML rectal enema 133 mL  Dose: 1 enema  Freq: Once as needed Route: RE  PRN Reason: constipation  Start: 12/30/23 0110 End: 12/31/23 1840   Admin Instructions:   Use after MiraLAX, Senokot, and Dulcolax.                guaiFENesin (Robitussin) 100 MG/5 ML oral liquid 200 mg  Dose: 200 mg  Freq: Every 4 hours PRN Route: OR  PRN Reason: cough  Start: 12/30/23 0110 End: 12/31/23 1840                heparin (Porcine) 5000 UNIT/ML injection 5,000 Units  Dose: 5,000 Units  Freq: 2 times per day Route: SC  Start: 12/30/23 0900 End: 12/31/23 1840            [id number]     769912      [id number]        ipratropium-albuterol (Duoneb) 0.5-2.5 (3) MG/3ML inhalation solution 3 mL  Dose: 3 mL  Freq: Every 6 hours PRN Route: Nebulization  PRN Reasons: Wheezing,Shortness of  Breath  Start: 12/30/23 1247 End: 12/31/23 1840   Order specific questions:               210966     178547         mycophenolate sodium (Myfortic) DR tab 360 mg  Dose: 360 mg  Freq: 2 times daily before meals Route: OR  Start: 12/30/23 0700 End: 12/31/23 1840   Admin Instructions:   Give on an empty stomach.  CAUTION: HAZARDOUS DRUG            569986     488518      566398     29        ondansetron (Zofran) 4 MG/2ML injection 4 mg  Dose: 4 mg  Freq: Every 6 hours PRN Route: IV  PRN Reasons: Nausea,vomiting  Start: 12/30/23 0110 End: 12/31/23 1840   Admin Instructions:   Default antiemetic sequence (unless otherwise preferred by patient):  1. ondansetron (Zofran)  2. prochlorperazine (Compazine). Wait 15 minutes before proceeding to next medication in sequence.  Follow therapeutic duplication policy.                polyethylene glycol (PEG 3350) (Miralax) 17 g oral packet 17 g  Dose: 17 g  Freq: DAILY PRN Route: OR  PRN Reason: constipation  PRN Comment: If no bowel movement in last 24 hours  Start: 12/30/23 0110 End: 12/31/23 1840   Admin Instructions:   Use prior to Senokot, Dulcolax, or Fleet's Enema.                prochlorperazine (Compazine) 10 MG/2ML injection 5 mg  Dose: 5 mg  Freq: Every 8 hours PRN Route: IV  PRN Reasons: Nausea,vomiting  Start: 12/30/23 0110 End: 12/31/23 1840   Admin Instructions:   Default antiemetic sequence (unless otherwise preferred by patient):  1. ondansetron (Zofran) 2. prochlorperazine (Compazine). Wait 15 minutes before proceeding to next medication in sequence.  Follow therapeutic duplication policy.                sennosides (Senokot) tab 17.2 mg  Dose: 17.2 mg  Freq: Nightly PRN Route: OR  PRN Comment: constipation, as needed if no bowel movement that day  Start: 12/30/23 0110 End: 12/31/23 1840   Admin Instructions:   Use after MiraLAX.    Use prior to Dulcolax or Fleet's Enema.                sodium chloride 0.9% infusion  Rate: 75 mL/hr Freq: Continuous Route: IV  Start:  12/30/23 2145 End: 12/31/23 1840            [id number]      853107        sodium chloride 0.9% infusion  Rate: 75 mL/hr Freq: Continuous Route: IV  Start: 12/30/23 0115 End: 12/30/23 0947            798739         sodium chloride 0.9% infusion  Rate: 125 mL/hr Freq: Continuous Route: IV  Start: 12/30/23 0115 End: 12/30/23 0314   Admin Instructions:   ED holding order only  Cancel if other admission orders exist!            (0115)33         tacrolimus (Prograf) cap 3 mg  Dose: 3 mg  Freq: 2 times daily Route: OR  Start: 12/30/23 0900 End: 12/31/23 1840   Admin Instructions:   CAUTION: HAZARDOUS DRUG            326230     [id number]      655467        Medications 12/22 12/23 12/24 12/25 12/26 12/27 12/28 12/29 12/30 12/31

## 2024-01-06 ENCOUNTER — TELEPHONE (OUTPATIENT)
Dept: NEPHROLOGY | Facility: CLINIC | Age: 34
End: 2024-01-06

## 2024-01-06 DIAGNOSIS — Z94.0 RENAL TRANSPLANT RECIPIENT: Primary | ICD-10-CM

## 2024-01-06 NOTE — TELEPHONE ENCOUNTER
Please put in standing orders for kidney trans    Cbc cmp  ua   urine microalb/creat  Tacrolimus level    Thanks   He should do later jan, ,April July October    Pleasenotify susana erazo

## 2024-01-15 NOTE — PROGRESS NOTES
Multiple attempts to reach pt and messages left with no return call.  Patient completed virtual HFU appt with PCP on 1/5/24.  Encounter closing.

## 2024-03-01 ENCOUNTER — LAB ENCOUNTER (OUTPATIENT)
Dept: LAB | Age: 34
End: 2024-03-01
Attending: INTERNAL MEDICINE
Payer: COMMERCIAL

## 2024-03-01 DIAGNOSIS — Z94.0 RENAL TRANSPLANT RECIPIENT (HCC): ICD-10-CM

## 2024-03-01 LAB
ALBUMIN SERPL-MCNC: 4.3 G/DL (ref 3.2–4.8)
ALBUMIN/GLOB SERPL: 1.3 {RATIO} (ref 1–2)
ALP LIVER SERPL-CCNC: 95 U/L
ALT SERPL-CCNC: 26 U/L
ANION GAP SERPL CALC-SCNC: 4 MMOL/L (ref 0–18)
AST SERPL-CCNC: 22 U/L (ref ?–34)
BASOPHILS # BLD AUTO: 0.02 X10(3) UL (ref 0–0.2)
BASOPHILS NFR BLD AUTO: 0.3 %
BILIRUB SERPL-MCNC: 0.8 MG/DL (ref 0.3–1.2)
BILIRUB UR QL: NEGATIVE
BUN BLD-MCNC: 15 MG/DL (ref 9–23)
BUN/CREAT SERPL: 14.3 (ref 10–20)
CALCIUM BLD-MCNC: 10.1 MG/DL (ref 8.7–10.4)
CHLORIDE SERPL-SCNC: 110 MMOL/L (ref 98–112)
CLARITY UR: CLEAR
CO2 SERPL-SCNC: 29 MMOL/L (ref 21–32)
COLOR UR: COLORLESS
CREAT BLD-MCNC: 1.05 MG/DL
CREAT UR-SCNC: 73.3 MG/DL
DEPRECATED RDW RBC AUTO: 45.5 FL (ref 35.1–46.3)
EGFRCR SERPLBLD CKD-EPI 2021: 95 ML/MIN/1.73M2 (ref 60–?)
EOSINOPHIL # BLD AUTO: 0.2 X10(3) UL (ref 0–0.7)
EOSINOPHIL NFR BLD AUTO: 3.1 %
ERYTHROCYTE [DISTWIDTH] IN BLOOD BY AUTOMATED COUNT: 13.7 % (ref 11–15)
FASTING STATUS PATIENT QL REPORTED: YES
GLOBULIN PLAS-MCNC: 3.4 G/DL (ref 2.8–4.4)
GLUCOSE BLD-MCNC: 90 MG/DL (ref 70–99)
GLUCOSE UR-MCNC: NORMAL MG/DL
HCT VFR BLD AUTO: 44 %
HGB BLD-MCNC: 15.4 G/DL
HGB UR QL STRIP.AUTO: NEGATIVE
IMM GRANULOCYTES # BLD AUTO: 0.01 X10(3) UL (ref 0–1)
IMM GRANULOCYTES NFR BLD: 0.2 %
KETONES UR-MCNC: NEGATIVE MG/DL
LEUKOCYTE ESTERASE UR QL STRIP.AUTO: NEGATIVE
LYMPHOCYTES # BLD AUTO: 2.06 X10(3) UL (ref 1–4)
LYMPHOCYTES NFR BLD AUTO: 32.4 %
MCH RBC QN AUTO: 31.9 PG (ref 26–34)
MCHC RBC AUTO-ENTMCNC: 35 G/DL (ref 31–37)
MCV RBC AUTO: 91.1 FL
MICROALBUMIN UR-MCNC: <0.3 MG/DL
MONOCYTES # BLD AUTO: 0.51 X10(3) UL (ref 0.1–1)
MONOCYTES NFR BLD AUTO: 8 %
NEUTROPHILS # BLD AUTO: 3.55 X10 (3) UL (ref 1.5–7.7)
NEUTROPHILS # BLD AUTO: 3.55 X10(3) UL (ref 1.5–7.7)
NEUTROPHILS NFR BLD AUTO: 56 %
NITRITE UR QL STRIP.AUTO: NEGATIVE
OSMOLALITY SERPL CALC.SUM OF ELEC: 296 MOSM/KG (ref 275–295)
PH UR: 6 [PH] (ref 5–8)
PLATELET # BLD AUTO: 237 10(3)UL (ref 150–450)
POTASSIUM SERPL-SCNC: 4.6 MMOL/L (ref 3.5–5.1)
PROT SERPL-MCNC: 7.7 G/DL (ref 5.7–8.2)
PROT UR-MCNC: NEGATIVE MG/DL
RBC # BLD AUTO: 4.83 X10(6)UL
SODIUM SERPL-SCNC: 143 MMOL/L (ref 136–145)
SP GR UR STRIP: 1.02 (ref 1–1.03)
UROBILINOGEN UR STRIP-ACNC: NORMAL
WBC # BLD AUTO: 6.4 X10(3) UL (ref 4–11)

## 2024-03-01 PROCEDURE — 80197 ASSAY OF TACROLIMUS: CPT

## 2024-03-01 PROCEDURE — 36415 COLL VENOUS BLD VENIPUNCTURE: CPT

## 2024-03-01 PROCEDURE — 82043 UR ALBUMIN QUANTITATIVE: CPT

## 2024-03-01 PROCEDURE — 82570 ASSAY OF URINE CREATININE: CPT

## 2024-03-01 PROCEDURE — 81003 URINALYSIS AUTO W/O SCOPE: CPT

## 2024-03-01 PROCEDURE — 80053 COMPREHEN METABOLIC PANEL: CPT

## 2024-03-01 PROCEDURE — 85025 COMPLETE CBC W/AUTO DIFF WBC: CPT

## 2024-03-05 LAB — TACROLIMUS LVL: 5.4 NG/ML

## 2024-03-11 RX ORDER — AMLODIPINE BESYLATE 10 MG/1
10 TABLET ORAL DAILY
Qty: 90 TABLET | Refills: 0 | Status: SHIPPED | OUTPATIENT
Start: 2024-03-11 | End: 2024-06-09

## 2024-06-13 RX ORDER — AMLODIPINE BESYLATE 10 MG/1
10 TABLET ORAL DAILY
Qty: 90 TABLET | Refills: 0 | Status: SHIPPED | OUTPATIENT
Start: 2024-06-13

## 2024-06-13 NOTE — TELEPHONE ENCOUNTER
Last  office visit   1/5/2024  (Shaneedicin)    Return to  clinic   June /July 2024    Follow up appointment  not schedule

## 2024-09-12 RX ORDER — AMLODIPINE BESYLATE 10 MG/1
10 TABLET ORAL DAILY
Qty: 90 TABLET | Refills: 1 | Status: SHIPPED | OUTPATIENT
Start: 2024-09-12

## 2024-09-12 NOTE — TELEPHONE ENCOUNTER
Last office visit- 1/5/24    Return to clinic- June or July 2024    Follow up- no appointment scheduled

## 2024-09-13 ENCOUNTER — LAB ENCOUNTER (OUTPATIENT)
Dept: LAB | Age: 34
End: 2024-09-13
Attending: INTERNAL MEDICINE
Payer: COMMERCIAL

## 2024-09-13 DIAGNOSIS — Z94.0 RENAL TRANSPLANT RECIPIENT (HCC): ICD-10-CM

## 2024-09-13 LAB
ALBUMIN SERPL-MCNC: 4.8 G/DL (ref 3.2–4.8)
ALBUMIN/GLOB SERPL: 1.4 {RATIO} (ref 1–2)
ALP LIVER SERPL-CCNC: 106 U/L
ALT SERPL-CCNC: 21 U/L
ANION GAP SERPL CALC-SCNC: 8 MMOL/L (ref 0–18)
AST SERPL-CCNC: 20 U/L (ref ?–34)
BASOPHILS # BLD AUTO: 0.03 X10(3) UL (ref 0–0.2)
BASOPHILS NFR BLD AUTO: 0.4 %
BILIRUB SERPL-MCNC: 0.7 MG/DL (ref 0.3–1.2)
BILIRUB UR QL: NEGATIVE
BUN BLD-MCNC: 20 MG/DL (ref 9–23)
BUN/CREAT SERPL: 15.4 (ref 10–20)
CALCIUM BLD-MCNC: 10.7 MG/DL (ref 8.7–10.4)
CHLORIDE SERPL-SCNC: 109 MMOL/L (ref 98–112)
CLARITY UR: CLEAR
CO2 SERPL-SCNC: 27 MMOL/L (ref 21–32)
CREAT BLD-MCNC: 1.3 MG/DL
CREAT UR-SCNC: 119.5 MG/DL
DEPRECATED RDW RBC AUTO: 41 FL (ref 35.1–46.3)
EGFRCR SERPLBLD CKD-EPI 2021: 73 ML/MIN/1.73M2 (ref 60–?)
EOSINOPHIL # BLD AUTO: 0.15 X10(3) UL (ref 0–0.7)
EOSINOPHIL NFR BLD AUTO: 1.8 %
ERYTHROCYTE [DISTWIDTH] IN BLOOD BY AUTOMATED COUNT: 12.4 % (ref 11–15)
FASTING STATUS PATIENT QL REPORTED: NO
GLOBULIN PLAS-MCNC: 3.5 G/DL (ref 2–3.5)
GLUCOSE BLD-MCNC: 105 MG/DL (ref 70–99)
GLUCOSE UR-MCNC: NORMAL MG/DL
HCT VFR BLD AUTO: 39 %
HGB BLD-MCNC: 14.3 G/DL
HGB UR QL STRIP.AUTO: NEGATIVE
IMM GRANULOCYTES # BLD AUTO: 0.02 X10(3) UL (ref 0–1)
IMM GRANULOCYTES NFR BLD: 0.2 %
KETONES UR-MCNC: NEGATIVE MG/DL
LEUKOCYTE ESTERASE UR QL STRIP.AUTO: NEGATIVE
LYMPHOCYTES # BLD AUTO: 2.61 X10(3) UL (ref 1–4)
LYMPHOCYTES NFR BLD AUTO: 31.2 %
MCH RBC QN AUTO: 33.2 PG (ref 26–34)
MCHC RBC AUTO-ENTMCNC: 36.7 G/DL (ref 31–37)
MCV RBC AUTO: 90.5 FL
MICROALBUMIN UR-MCNC: <0.3 MG/DL
MONOCYTES # BLD AUTO: 0.63 X10(3) UL (ref 0.1–1)
MONOCYTES NFR BLD AUTO: 7.5 %
NEUTROPHILS # BLD AUTO: 4.93 X10 (3) UL (ref 1.5–7.7)
NEUTROPHILS # BLD AUTO: 4.93 X10(3) UL (ref 1.5–7.7)
NEUTROPHILS NFR BLD AUTO: 58.9 %
NITRITE UR QL STRIP.AUTO: NEGATIVE
OSMOLALITY SERPL CALC.SUM OF ELEC: 301 MOSM/KG (ref 275–295)
PH UR: 5.5 [PH] (ref 5–8)
PLATELET # BLD AUTO: 278 10(3)UL (ref 150–450)
POTASSIUM SERPL-SCNC: 5 MMOL/L (ref 3.5–5.1)
PROT SERPL-MCNC: 8.3 G/DL (ref 5.7–8.2)
PROT UR-MCNC: NEGATIVE MG/DL
RBC # BLD AUTO: 4.31 X10(6)UL
SODIUM SERPL-SCNC: 144 MMOL/L (ref 136–145)
SP GR UR STRIP: 1.02 (ref 1–1.03)
UROBILINOGEN UR STRIP-ACNC: NORMAL
WBC # BLD AUTO: 8.4 X10(3) UL (ref 4–11)

## 2024-09-13 PROCEDURE — 80053 COMPREHEN METABOLIC PANEL: CPT

## 2024-09-13 PROCEDURE — 82570 ASSAY OF URINE CREATININE: CPT

## 2024-09-13 PROCEDURE — 80197 ASSAY OF TACROLIMUS: CPT

## 2024-09-13 PROCEDURE — 81003 URINALYSIS AUTO W/O SCOPE: CPT

## 2024-09-13 PROCEDURE — 36415 COLL VENOUS BLD VENIPUNCTURE: CPT

## 2024-09-13 PROCEDURE — 85025 COMPLETE CBC W/AUTO DIFF WBC: CPT

## 2024-09-13 PROCEDURE — 82043 UR ALBUMIN QUANTITATIVE: CPT

## 2024-09-18 LAB — TACROLIMUS LVL: 7.9 NG/ML

## 2024-11-19 ENCOUNTER — PATIENT MESSAGE (OUTPATIENT)
Dept: NEPHROLOGY | Facility: CLINIC | Age: 34
End: 2024-11-19

## 2024-11-19 NOTE — TELEPHONE ENCOUNTER
Patient asking for medical clearance to have root canal, form in nurse station.  Last telemedicine visit: 1/5/24, labs 9/13/24

## 2024-11-20 NOTE — TELEPHONE ENCOUNTER
Called patient, letter ready for  at .  Appointment is not today; it is for Friday 11/22/24- states he would need to reschedule.  He thought it was for today.

## 2024-12-09 RX ORDER — AMLODIPINE BESYLATE 10 MG/1
10 TABLET ORAL DAILY
Qty: 90 TABLET | Refills: 1 | Status: SHIPPED | OUTPATIENT
Start: 2024-12-09

## 2024-12-09 RX ORDER — CARVEDILOL 6.25 MG/1
6.25 TABLET ORAL 2 TIMES DAILY WITH MEALS
Qty: 180 TABLET | Refills: 3 | Status: SHIPPED | OUTPATIENT
Start: 2024-12-09

## 2024-12-18 ENCOUNTER — TELEPHONE (OUTPATIENT)
Dept: NEPHROLOGY | Facility: CLINIC | Age: 34
End: 2024-12-18

## 2024-12-18 DIAGNOSIS — Z94.0 RENAL TRANSPLANT RECIPIENT (HCC): Primary | ICD-10-CM

## 2024-12-18 NOTE — TELEPHONE ENCOUNTER
Flower from [facility] calling to inform that they have received prescriptions for Tacrolimus and Mycophenolate medications, but they are not able to process prescriptions, so the prescription must be sent to [facility] from our office - Fax  # [phone] and the phone number is # [phone].

## 2024-12-19 RX ORDER — MYCOPHENOLIC ACID 180 MG/1
360 TABLET, DELAYED RELEASE ORAL
Qty: 360 TABLET | Refills: 3 | Status: SHIPPED | OUTPATIENT
Start: 2024-12-19

## 2024-12-19 RX ORDER — TACROLIMUS 1 MG/1
3 CAPSULE ORAL 2 TIMES DAILY
Qty: 540 CAPSULE | Refills: 3 | Status: SHIPPED | OUTPATIENT
Start: 2024-12-19

## 2024-12-19 NOTE — TELEPHONE ENCOUNTER
Called patient.  Asked for medication refills.  Teaching on how to request refills through my chart.  He agreed to contact the office for refills and not pharmacies.  Patient needs prescriptions sent to [facility].

## 2024-12-19 NOTE — TELEPHONE ENCOUNTER
Prescription refill request  LOV: 1/5/24  RTC: July 2024  F/U: 1/10/25    Component      Latest Ref Rng 9/13/2024   Tacrolimus Lvl      2.0 - 20.0 ng/mL 7.9      Patient stated he will do labs tomorrow.

## 2024-12-20 ENCOUNTER — LAB ENCOUNTER (OUTPATIENT)
Dept: LAB | Age: 34
End: 2024-12-20
Attending: INTERNAL MEDICINE
Payer: COMMERCIAL

## 2024-12-20 ENCOUNTER — TELEPHONE (OUTPATIENT)
Dept: NEPHROLOGY | Facility: CLINIC | Age: 34
End: 2024-12-20

## 2024-12-20 DIAGNOSIS — Z94.0 RENAL TRANSPLANT RECIPIENT (HCC): ICD-10-CM

## 2024-12-20 LAB
ALBUMIN SERPL-MCNC: 4.9 G/DL (ref 3.2–4.8)
ALBUMIN/GLOB SERPL: 1.4 {RATIO} (ref 1–2)
ALP LIVER SERPL-CCNC: 107 U/L
ALT SERPL-CCNC: 28 U/L
ANION GAP SERPL CALC-SCNC: 7 MMOL/L (ref 0–18)
AST SERPL-CCNC: 21 U/L (ref ?–34)
BASOPHILS # BLD AUTO: 0.03 X10(3) UL (ref 0–0.2)
BASOPHILS NFR BLD AUTO: 0.3 %
BILIRUB SERPL-MCNC: 0.8 MG/DL (ref 0.3–1.2)
BILIRUB UR QL: NEGATIVE
BUN BLD-MCNC: 14 MG/DL (ref 9–23)
BUN/CREAT SERPL: 11.5 (ref 10–20)
CALCIUM BLD-MCNC: 10.6 MG/DL (ref 8.7–10.4)
CHLORIDE SERPL-SCNC: 107 MMOL/L (ref 98–112)
CLARITY UR: CLEAR
CO2 SERPL-SCNC: 27 MMOL/L (ref 21–32)
CREAT BLD-MCNC: 1.22 MG/DL
CREAT UR-SCNC: 122 MG/DL
DEPRECATED RDW RBC AUTO: 41.3 FL (ref 35.1–46.3)
EGFRCR SERPLBLD CKD-EPI 2021: 79 ML/MIN/1.73M2 (ref 60–?)
EOSINOPHIL # BLD AUTO: 0.14 X10(3) UL (ref 0–0.7)
EOSINOPHIL NFR BLD AUTO: 1.5 %
ERYTHROCYTE [DISTWIDTH] IN BLOOD BY AUTOMATED COUNT: 12.5 % (ref 11–15)
FASTING STATUS PATIENT QL REPORTED: NO
GLOBULIN PLAS-MCNC: 3.4 G/DL (ref 2–3.5)
GLUCOSE BLD-MCNC: 99 MG/DL (ref 70–99)
GLUCOSE UR-MCNC: NORMAL MG/DL
HCT VFR BLD AUTO: 43.4 %
HGB BLD-MCNC: 15.7 G/DL
HGB UR QL STRIP.AUTO: NEGATIVE
IMM GRANULOCYTES # BLD AUTO: 0.03 X10(3) UL (ref 0–1)
IMM GRANULOCYTES NFR BLD: 0.3 %
KETONES UR-MCNC: NEGATIVE MG/DL
LEUKOCYTE ESTERASE UR QL STRIP.AUTO: NEGATIVE
LYMPHOCYTES # BLD AUTO: 2.69 X10(3) UL (ref 1–4)
LYMPHOCYTES NFR BLD AUTO: 28.1 %
MCH RBC QN AUTO: 32.9 PG (ref 26–34)
MCHC RBC AUTO-ENTMCNC: 36.2 G/DL (ref 31–37)
MCV RBC AUTO: 91 FL
MICROALBUMIN UR-MCNC: <0.3 MG/DL
MONOCYTES # BLD AUTO: 0.7 X10(3) UL (ref 0.1–1)
MONOCYTES NFR BLD AUTO: 7.3 %
NEUTROPHILS # BLD AUTO: 5.97 X10 (3) UL (ref 1.5–7.7)
NEUTROPHILS # BLD AUTO: 5.97 X10(3) UL (ref 1.5–7.7)
NEUTROPHILS NFR BLD AUTO: 62.5 %
NITRITE UR QL STRIP.AUTO: NEGATIVE
OSMOLALITY SERPL CALC.SUM OF ELEC: 293 MOSM/KG (ref 275–295)
PH UR: 6 [PH] (ref 5–8)
PLATELET # BLD AUTO: 254 10(3)UL (ref 150–450)
POTASSIUM SERPL-SCNC: 4.9 MMOL/L (ref 3.5–5.1)
PROT SERPL-MCNC: 8.3 G/DL (ref 5.7–8.2)
PROT UR-MCNC: NEGATIVE MG/DL
RBC # BLD AUTO: 4.77 X10(6)UL
SODIUM SERPL-SCNC: 141 MMOL/L (ref 136–145)
SP GR UR STRIP: 1.02 (ref 1–1.03)
UROBILINOGEN UR STRIP-ACNC: NORMAL
WBC # BLD AUTO: 9.6 X10(3) UL (ref 4–11)

## 2024-12-20 PROCEDURE — 36415 COLL VENOUS BLD VENIPUNCTURE: CPT

## 2024-12-20 PROCEDURE — 80053 COMPREHEN METABOLIC PANEL: CPT

## 2024-12-20 PROCEDURE — 82570 ASSAY OF URINE CREATININE: CPT

## 2024-12-20 PROCEDURE — 82043 UR ALBUMIN QUANTITATIVE: CPT

## 2024-12-20 PROCEDURE — 81003 URINALYSIS AUTO W/O SCOPE: CPT

## 2024-12-20 PROCEDURE — 80197 ASSAY OF TACROLIMUS: CPT

## 2024-12-20 PROCEDURE — 85025 COMPLETE CBC W/AUTO DIFF WBC: CPT

## 2024-12-20 RX ORDER — MYCOPHENOLIC ACID 180 MG/1
360 TABLET, DELAYED RELEASE ORAL
Qty: 360 TABLET | Refills: 3 | OUTPATIENT
Start: 2024-12-20

## 2024-12-20 NOTE — TELEPHONE ENCOUNTER
R called [facility] confirmed that order was received but need more information from patient provided #[phone] to call in order  process  .Rn undated pt and will call ,also informed of note below and not aware not familiar with the pharmacy and agreed to disregard the message .

## 2024-12-20 NOTE — TELEPHONE ENCOUNTER
[facility] states modifications mycophenolate sodium 180 MG Oral Tab EC and tacrolimus 1 mg  is changing to Rajesh and Chinedu please follow up

## 2024-12-23 LAB — TACROLIMUS LVL: 14 NG/ML

## 2024-12-26 ENCOUNTER — TELEPHONE (OUTPATIENT)
Dept: NEPHROLOGY | Facility: CLINIC | Age: 34
End: 2024-12-26

## 2024-12-26 DIAGNOSIS — I10 ESSENTIAL HYPERTENSION: ICD-10-CM

## 2024-12-26 DIAGNOSIS — Z94.0 RENAL TRANSPLANT RECIPIENT (HCC): Primary | ICD-10-CM

## 2024-12-26 NOTE — TELEPHONE ENCOUNTER
Called- related test result message.  Voiced understanding and agreed to keep appointment in January and labs every 4 months.

## 2024-12-26 NOTE — TELEPHONE ENCOUNTER
Jerome Mendez MD Calvillo, Maria, LPN  Please tell patient labs look good. He should be doing them every four months thank you. Next time you should do them again is April. He should call for orders at that time. This is very important thank you Jenifer.          Previous Messages

## 2024-12-26 NOTE — TELEPHONE ENCOUNTER
Dr. Mendez; standing orders  2025, will need new orders.    Has an appointment 1/10/25- instructed patient to mention it on that day of visit.    Orders needed:Tacrolimus, Micoralb/creat urine, UA, CMP, CBC

## 2024-12-27 ENCOUNTER — TELEPHONE (OUTPATIENT)
Dept: NEPHROLOGY | Facility: CLINIC | Age: 34
End: 2024-12-27

## 2024-12-27 DIAGNOSIS — Z94.0 RENAL TRANSPLANT RECIPIENT (HCC): Primary | ICD-10-CM

## 2024-12-30 ENCOUNTER — TELEPHONE (OUTPATIENT)
Dept: NEPHROLOGY | Facility: CLINIC | Age: 34
End: 2024-12-30

## 2024-12-30 RX ORDER — TACROLIMUS 1 MG/1
CAPSULE ORAL
COMMUNITY
Start: 2024-12-30

## 2024-12-30 NOTE — TELEPHONE ENCOUNTER
Jerome Mendez MD Rios, Jennette B, RN  Please tell hi mlabs ok but tacro level high   I believe taking 3mg bid   Have hi mcut to 3mg in am   2mg in pm   Repeat tacrolimus level and pth only in two weeks  thanks Dolly  ( I put these orders in comp but lplease let susana know

## 2024-12-30 NOTE — TELEPHONE ENCOUNTER
Informed patient of note below  an last name verified and pt verbalized understanding.Medlist updated.

## 2024-12-30 NOTE — TELEPHONE ENCOUNTER
Micheline from pharmacy calling regards prescriptions to be transferred or resent to them. Please call.     mycophenolate sodium 180 MG Oral Tab EC     tacrolimus 1 MG Oral Cap       [facility] - [place], [place] - [address] [facility] [phone], [phone] [[id number]]

## 2025-01-02 NOTE — TELEPHONE ENCOUNTER
Called pharmacy: [facility] - [address] [facility] [phone], [phone] [[id number]]      Prescription transferred:   tacrolimus 1 MG Oral Cap Take 3 tablets(3mg) by mouth in the morning and 2 tablets (2 mg ) in the evening     mycophenolate sodium 180 MG Oral Tab EC  Sig: Take 2 tablets (360 mg total) by mouth 2 (two) times daily before meals.    Patient will need to do labs 2 weeks post starting new  of medication per pharmacist, making sure that medication is stable.  Patient has appointment in 2 weeks, standing order will be needed and be placed on day of visit.  Called patient and informed him, he voiced understanding.  Medication will be mailed over night.

## 2025-01-07 ENCOUNTER — TELEPHONE (OUTPATIENT)
Dept: NEPHROLOGY | Facility: CLINIC | Age: 35
End: 2025-01-07

## 2025-01-07 NOTE — TELEPHONE ENCOUNTER
Patient Pharmacy benefits is requesting a confirmation on whether that patient is taking Tacrolimus 0.5 mg or 1 mg, or should the patient be taking both doses.  She states the pharmacy has a denied claimed due to multiple doses listed on file for him.  Please call

## 2025-01-08 NOTE — TELEPHONE ENCOUNTER
Rn received a call from Yamini and confirmed  that pt is taking Tacrolimus 3mg in am and 2 mg in the evening,and sated pt already received the meds.

## 2025-01-10 ENCOUNTER — OFFICE VISIT (OUTPATIENT)
Dept: NEPHROLOGY | Facility: CLINIC | Age: 35
End: 2025-01-10
Payer: COMMERCIAL

## 2025-01-10 VITALS
DIASTOLIC BLOOD PRESSURE: 64 MMHG | WEIGHT: 177 LBS | HEART RATE: 99 BPM | BODY MASS INDEX: 25.34 KG/M2 | SYSTOLIC BLOOD PRESSURE: 110 MMHG | HEIGHT: 70 IN

## 2025-01-10 DIAGNOSIS — I10 ESSENTIAL HYPERTENSION: ICD-10-CM

## 2025-01-10 DIAGNOSIS — Z94.0 RENAL TRANSPLANT RECIPIENT (HCC): Primary | ICD-10-CM

## 2025-01-10 PROCEDURE — 3074F SYST BP LT 130 MM HG: CPT | Performed by: INTERNAL MEDICINE

## 2025-01-10 PROCEDURE — 99214 OFFICE O/P EST MOD 30 MIN: CPT | Performed by: INTERNAL MEDICINE

## 2025-01-10 PROCEDURE — 3008F BODY MASS INDEX DOCD: CPT | Performed by: INTERNAL MEDICINE

## 2025-01-10 PROCEDURE — 3078F DIAST BP <80 MM HG: CPT | Performed by: INTERNAL MEDICINE

## 2025-01-10 NOTE — PATIENT INSTRUCTIONS
Please call dr samson davis    For vascectomy evaluation  keep me posted susana    Please make sure you follow-up with a dentist    Keep an eye on your skin    Please do labs around January 20 and then do again in April July and October    Please see me back in a year next January but please do not forget to do the labs they will be in the computer    Have a happy and healthy new year

## 2025-01-12 ENCOUNTER — TELEPHONE (OUTPATIENT)
Dept: NEPHROLOGY | Facility: CLINIC | Age: 35
End: 2025-01-12

## 2025-01-12 DIAGNOSIS — Z94.0 RENAL TRANSPLANT RECIPIENT (HCC): Primary | ICD-10-CM

## 2025-01-13 NOTE — PROGRESS NOTES
Progress Note     Raúl Ly    Doing well  abouit 9 years post LRT of kidney feeels good   Has titus kids interestedin vasectomy   Eating well work going well   No complaints        HISTORY:  Past Medical History:    Gross hematuria    High blood pressure    IgA nephropathy    POSSIBLE PER. NG.    Renal disorder    Varicella      Past Surgical History:   Procedure Laterality Date    Transplantation of kidney  03/2016      Social History     Tobacco Use    Smoking status: Never     Passive exposure: Never    Smokeless tobacco: Never   Substance Use Topics    Alcohol use: No     Alcohol/week: 0.0 standard drinks of alcohol         Medications (Active prior to today's visit):  Current Outpatient Medications   Medication Sig Dispense Refill    tacrolimus 1 MG Oral Cap Take 3 tablets(3mg) by mouth in the morning and 2 tablets (2 mg ) in the evening      mycophenolate sodium 180 MG Oral Tab EC Take 2 tablets (360 mg total) by mouth 2 (two) times daily before meals. 360 tablet 3    CARVEDILOL 6.25 MG Oral Tab TAKE 1 TABLET(6.25 MG) BY MOUTH TWICE DAILY WITH MEALS 180 tablet 3    AMLODIPINE 10 MG Oral Tab TAKE 1 TABLET(10 MG) BY MOUTH DAILY 90 tablet 1    Omega-3-acid Ethyl Esters 1 g Oral Cap Take 2 capsules (2 g total) by mouth 2 (two) times daily.         Allergies:  Allergies[1]      ROS:     Constitutional:  Negative for decreased activity, fever, irritability and lethargy feels well  ENMT:  Negative for ear drainage, hearing loss and nasal drainage  Eyes:  Negative for eye discharge and vision loss  Cardiovascular:  Negative for chest pain, sob  Respiratory:  Negative for cough, dyspnea and wheezing  Gastrointestinal:  Negative for abdominal pain, constipation  Genitourinary:  Negative for dysuria and hematuria  Endocrine:  Negative for abnormal sleep patterns  Hema/Lymph:  Negative for easy bleeding and easy bruising  Integumentary:  Negative for pruritus and rash  Musculoskeletal:  Negative for  bone/joint symptoms  Neurological:  Negative for gait disturbance  Psychiatric:  Negative for inappropriate interaction and psychiatric symptoms      Vitals:    01/10/25 1708   BP: 110/64   Pulse: 99       PHYSICAL EXAM:   Constitutional: appears well hydrated alert and responsive looks good  Head/Face: normocephalic  Eyes/Vision: normal extraocular motion is intact  Nose/Mouth/Throat:mucous membranes are moist   Neck/Thyroid: neck is supple without adenopathy  Lymphatic: no abnormal cervical, supraclavicular adenopathy is noted  Respiratory:  lungs are clear to auscultation bilaterally  Cardiovascular: regular rate and rhythm   Abdomen: soft, non-tender, non-distended, BS normal  Skin/Hair: no unusual rashes present, no abnormal bruising noted no lesions  Back/Spine: no abnormalities noted  Musculoskeletal: no deformities  Extremities: no edema  Neurological:  Grossly normal       ASSESSMENT/PLAN:   Assessment   Encounter Diagnoses   Name Primary?    Renal transplant recipient (HCC) Yes    Essential hypertension        LRT stable tacro level was high reduced to 3mg am 2mg pm from 3-3   Will repeat level  Health maintence  up to date w vaccines,   4. Htn controlled w carvedilol amlodipine   Labs good creat 1.22 no protein in urine  6. Vasectomy  referred to urology dr ryan mckeon  labs dany.. April   July    oct  See me one year.. watch skin   bp etc       Orders This Visit:  No orders of the defined types were placed in this encounter.      Meds This Visit:  Requested Prescriptions      No prescriptions requested or ordered in this encounter       Imaging & Referrals:  None     1/12/2025  Jerome Mendez MD               [1] No Known Allergies

## 2025-01-13 NOTE — TELEPHONE ENCOUNTER
Standing orders placed for labs, patient notified.   Per 1/10/25 AVS notes to do labs around January 20 and then do again in April July and October.    F/u 1 year

## 2025-01-13 NOTE — TELEPHONE ENCOUNTER
Please put in standing orders for kidney trans   Cbc cmp  lipid  ua urinemicroalb/creat, tacrolimus level thanks

## 2025-01-20 ENCOUNTER — LAB ENCOUNTER (OUTPATIENT)
Dept: LAB | Facility: REFERENCE LAB | Age: 35
End: 2025-01-20
Attending: INTERNAL MEDICINE
Payer: COMMERCIAL

## 2025-01-20 DIAGNOSIS — Z94.0 RENAL TRANSPLANT RECIPIENT (HCC): ICD-10-CM

## 2025-01-20 LAB
ALBUMIN SERPL-MCNC: 4.8 G/DL (ref 3.2–4.8)
ALBUMIN/GLOB SERPL: 1.5 {RATIO} (ref 1–2)
ALP LIVER SERPL-CCNC: 85 U/L
ALT SERPL-CCNC: 23 U/L
ANION GAP SERPL CALC-SCNC: 10 MMOL/L (ref 0–18)
AST SERPL-CCNC: 20 U/L (ref ?–34)
BASOPHILS # BLD AUTO: 0.02 X10(3) UL (ref 0–0.2)
BASOPHILS NFR BLD AUTO: 0.4 %
BILIRUB SERPL-MCNC: 0.8 MG/DL (ref 0.3–1.2)
BILIRUB UR QL: NEGATIVE
BUN BLD-MCNC: 13 MG/DL (ref 9–23)
BUN/CREAT SERPL: 12.3 (ref 10–20)
CALCIUM BLD-MCNC: 10.1 MG/DL (ref 8.7–10.4)
CHLORIDE SERPL-SCNC: 104 MMOL/L (ref 98–112)
CHOLEST SERPL-MCNC: 164 MG/DL (ref ?–200)
CLARITY UR: CLEAR
CO2 SERPL-SCNC: 28 MMOL/L (ref 21–32)
CREAT BLD-MCNC: 1.06 MG/DL
CREAT UR-SCNC: 127.9 MG/DL
DEPRECATED RDW RBC AUTO: 42.1 FL (ref 35.1–46.3)
EGFRCR SERPLBLD CKD-EPI 2021: 93 ML/MIN/1.73M2 (ref 60–?)
EOSINOPHIL # BLD AUTO: 0.14 X10(3) UL (ref 0–0.7)
EOSINOPHIL NFR BLD AUTO: 2.6 %
ERYTHROCYTE [DISTWIDTH] IN BLOOD BY AUTOMATED COUNT: 12.7 % (ref 11–15)
FASTING PATIENT LIPID ANSWER: NO
FASTING STATUS PATIENT QL REPORTED: NO
GLOBULIN PLAS-MCNC: 3.3 G/DL (ref 2–3.5)
GLUCOSE BLD-MCNC: 100 MG/DL (ref 70–99)
GLUCOSE UR-MCNC: NORMAL MG/DL
HCT VFR BLD AUTO: 40.6 %
HDLC SERPL-MCNC: 24 MG/DL (ref 40–59)
HGB BLD-MCNC: 14.6 G/DL
HGB UR QL STRIP.AUTO: NEGATIVE
IMM GRANULOCYTES # BLD AUTO: 0.01 X10(3) UL (ref 0–1)
IMM GRANULOCYTES NFR BLD: 0.2 %
KETONES UR-MCNC: NEGATIVE MG/DL
LDLC SERPL CALC-MCNC: 67 MG/DL (ref ?–100)
LEUKOCYTE ESTERASE UR QL STRIP.AUTO: NEGATIVE
LYMPHOCYTES # BLD AUTO: 1.98 X10(3) UL (ref 1–4)
LYMPHOCYTES NFR BLD AUTO: 37.4 %
MCH RBC QN AUTO: 32.7 PG (ref 26–34)
MCHC RBC AUTO-ENTMCNC: 36 G/DL (ref 31–37)
MCV RBC AUTO: 91 FL
MICROALBUMIN UR-MCNC: <0.3 MG/DL
MONOCYTES # BLD AUTO: 0.51 X10(3) UL (ref 0.1–1)
MONOCYTES NFR BLD AUTO: 9.6 %
NEUTROPHILS # BLD AUTO: 2.63 X10 (3) UL (ref 1.5–7.7)
NEUTROPHILS # BLD AUTO: 2.63 X10(3) UL (ref 1.5–7.7)
NEUTROPHILS NFR BLD AUTO: 49.8 %
NITRITE UR QL STRIP.AUTO: NEGATIVE
NONHDLC SERPL-MCNC: 140 MG/DL (ref ?–130)
OSMOLALITY SERPL CALC.SUM OF ELEC: 294 MOSM/KG (ref 275–295)
PH UR: 6.5 [PH] (ref 5–8)
PLATELET # BLD AUTO: 223 10(3)UL (ref 150–450)
POTASSIUM SERPL-SCNC: 4.5 MMOL/L (ref 3.5–5.1)
PROT SERPL-MCNC: 8.1 G/DL (ref 5.7–8.2)
PROT UR-MCNC: NEGATIVE MG/DL
PTH-INTACT SERPL-MCNC: 125.5 PG/ML (ref 18.5–88)
RBC # BLD AUTO: 4.46 X10(6)UL
SODIUM SERPL-SCNC: 142 MMOL/L (ref 136–145)
SP GR UR STRIP: 1.02 (ref 1–1.03)
TRIGL SERPL-MCNC: 466 MG/DL (ref 30–149)
UROBILINOGEN UR STRIP-ACNC: NORMAL
VLDLC SERPL CALC-MCNC: 71 MG/DL (ref 0–30)
WBC # BLD AUTO: 5.3 X10(3) UL (ref 4–11)

## 2025-01-20 PROCEDURE — 83970 ASSAY OF PARATHORMONE: CPT

## 2025-01-20 PROCEDURE — 82570 ASSAY OF URINE CREATININE: CPT

## 2025-01-20 PROCEDURE — 80053 COMPREHEN METABOLIC PANEL: CPT

## 2025-01-20 PROCEDURE — 80197 ASSAY OF TACROLIMUS: CPT

## 2025-01-20 PROCEDURE — 82043 UR ALBUMIN QUANTITATIVE: CPT

## 2025-01-20 PROCEDURE — 85025 COMPLETE CBC W/AUTO DIFF WBC: CPT

## 2025-01-20 PROCEDURE — 80061 LIPID PANEL: CPT

## 2025-01-20 PROCEDURE — 81003 URINALYSIS AUTO W/O SCOPE: CPT

## 2025-01-20 PROCEDURE — 36415 COLL VENOUS BLD VENIPUNCTURE: CPT

## 2025-01-23 LAB — TACROLIMUS LVL: 3 NG/ML

## 2025-04-29 ENCOUNTER — LAB ENCOUNTER (OUTPATIENT)
Dept: LAB | Age: 35
End: 2025-04-29
Attending: INTERNAL MEDICINE
Payer: COMMERCIAL

## 2025-04-29 DIAGNOSIS — Z94.0 RENAL TRANSPLANT RECIPIENT (HCC): ICD-10-CM

## 2025-04-29 LAB
ALBUMIN SERPL-MCNC: 4.7 G/DL (ref 3.2–4.8)
ALBUMIN/GLOB SERPL: 1.6 {RATIO} (ref 1–2)
ALP LIVER SERPL-CCNC: 99 U/L (ref 45–117)
ALT SERPL-CCNC: 23 U/L (ref 10–49)
ANION GAP SERPL CALC-SCNC: 8 MMOL/L (ref 0–18)
AST SERPL-CCNC: 22 U/L (ref ?–34)
BASOPHILS # BLD AUTO: 0.04 X10(3) UL (ref 0–0.2)
BASOPHILS NFR BLD AUTO: 0.6 %
BILIRUB SERPL-MCNC: 0.5 MG/DL (ref 0.3–1.2)
BILIRUB UR QL: NEGATIVE
BUN BLD-MCNC: 14 MG/DL (ref 9–23)
BUN/CREAT SERPL: 11.7 (ref 10–20)
CALCIUM BLD-MCNC: 10 MG/DL (ref 8.7–10.4)
CHLORIDE SERPL-SCNC: 109 MMOL/L (ref 98–112)
CHOLEST SERPL-MCNC: 168 MG/DL (ref ?–200)
CLARITY UR: CLEAR
CO2 SERPL-SCNC: 27 MMOL/L (ref 21–32)
CREAT BLD-MCNC: 1.2 MG/DL (ref 0.7–1.3)
CREAT UR-SCNC: 123.3 MG/DL
DEPRECATED RDW RBC AUTO: 41.7 FL (ref 35.1–46.3)
EGFRCR SERPLBLD CKD-EPI 2021: 80 ML/MIN/1.73M2 (ref 60–?)
EOSINOPHIL # BLD AUTO: 0.15 X10(3) UL (ref 0–0.7)
EOSINOPHIL NFR BLD AUTO: 2.2 %
ERYTHROCYTE [DISTWIDTH] IN BLOOD BY AUTOMATED COUNT: 12.7 % (ref 11–15)
FASTING PATIENT LIPID ANSWER: NO
FASTING STATUS PATIENT QL REPORTED: NO
GLOBULIN PLAS-MCNC: 3 G/DL (ref 2–3.5)
GLUCOSE BLD-MCNC: 100 MG/DL (ref 70–99)
GLUCOSE UR-MCNC: NORMAL MG/DL
HCT VFR BLD AUTO: 42.6 % (ref 39–53)
HDLC SERPL-MCNC: 27 MG/DL (ref 40–59)
HGB BLD-MCNC: 15.1 G/DL (ref 13–17.5)
HGB UR QL STRIP.AUTO: NEGATIVE
IMM GRANULOCYTES # BLD AUTO: 0.01 X10(3) UL (ref 0–1)
IMM GRANULOCYTES NFR BLD: 0.1 %
KETONES UR-MCNC: NEGATIVE MG/DL
LDLC SERPL CALC-MCNC: 53 MG/DL (ref ?–100)
LEUKOCYTE ESTERASE UR QL STRIP.AUTO: NEGATIVE
LYMPHOCYTES # BLD AUTO: 2.54 X10(3) UL (ref 1–4)
LYMPHOCYTES NFR BLD AUTO: 36.8 %
MCH RBC QN AUTO: 31.9 PG (ref 26–34)
MCHC RBC AUTO-ENTMCNC: 35.4 G/DL (ref 31–37)
MCV RBC AUTO: 90.1 FL (ref 80–100)
MICROALBUMIN UR-MCNC: <0.3 MG/DL
MONOCYTES # BLD AUTO: 0.5 X10(3) UL (ref 0.1–1)
MONOCYTES NFR BLD AUTO: 7.2 %
NEUTROPHILS # BLD AUTO: 3.67 X10 (3) UL (ref 1.5–7.7)
NEUTROPHILS # BLD AUTO: 3.67 X10(3) UL (ref 1.5–7.7)
NEUTROPHILS NFR BLD AUTO: 53.1 %
NITRITE UR QL STRIP.AUTO: NEGATIVE
NONHDLC SERPL-MCNC: 141 MG/DL (ref ?–130)
OSMOLALITY SERPL CALC.SUM OF ELEC: 299 MOSM/KG (ref 275–295)
PH UR: 5.5 [PH] (ref 5–8)
PLATELET # BLD AUTO: 277 10(3)UL (ref 150–450)
POTASSIUM SERPL-SCNC: 5.3 MMOL/L (ref 3.5–5.1)
PROT SERPL-MCNC: 7.7 G/DL (ref 5.7–8.2)
PROT UR-MCNC: NEGATIVE MG/DL
RBC # BLD AUTO: 4.73 X10(6)UL (ref 4.3–5.7)
SODIUM SERPL-SCNC: 144 MMOL/L (ref 136–145)
SP GR UR STRIP: 1.02 (ref 1–1.03)
TRIGL SERPL-MCNC: 598 MG/DL (ref 30–149)
UROBILINOGEN UR STRIP-ACNC: NORMAL
VLDLC SERPL CALC-MCNC: 87 MG/DL (ref 0–30)
WBC # BLD AUTO: 6.9 X10(3) UL (ref 4–11)

## 2025-04-29 PROCEDURE — 85025 COMPLETE CBC W/AUTO DIFF WBC: CPT

## 2025-04-29 PROCEDURE — 36415 COLL VENOUS BLD VENIPUNCTURE: CPT

## 2025-04-29 PROCEDURE — 82043 UR ALBUMIN QUANTITATIVE: CPT

## 2025-04-29 PROCEDURE — 81003 URINALYSIS AUTO W/O SCOPE: CPT

## 2025-04-29 PROCEDURE — 80061 LIPID PANEL: CPT

## 2025-04-29 PROCEDURE — 80197 ASSAY OF TACROLIMUS: CPT

## 2025-04-29 PROCEDURE — 80053 COMPREHEN METABOLIC PANEL: CPT

## 2025-04-29 PROCEDURE — 82570 ASSAY OF URINE CREATININE: CPT

## 2025-05-03 LAB — TACROLIMUS LVL: 13.3 NG/ML

## (undated) DEVICE — DRAPE,UNDRBUT,WHT GRAD PCH,CAPPORT,20/CS: Brand: MEDLINE

## (undated) DEVICE — MINOR GENERAL: Brand: MEDLINE INDUSTRIES, INC.

## (undated) DEVICE — UNDYED BRAIDED (POLYGLACTIN 910), SYNTHETIC ABSORBABLE SUTURE: Brand: COATED VICRYL

## (undated) DEVICE — ELECTRODE ES L2.75IN XLN STD BLDE MOD E-Z CLN

## (undated) DEVICE — GAMMEX® PI HYBRID SIZE 7.5, STERILE POWDER-FREE SURGICAL GLOVE, POLYISOPRENE AND NEOPRENE BLEND: Brand: GAMMEX

## (undated) DEVICE — INTENDED FOR TISSUE SEPARATION, AND OTHER PROCEDURES THAT REQUIRE A SHARP SURGICAL BLADE TO PUNCTURE OR CUT.: Brand: BARD-PARKER ® STAINLESS STEEL BLADES

## (undated) DEVICE — DRAPE URO 112X63X131IN POLYPR FEN W/ PCH

## (undated) DEVICE — SUTURE VCRL SZ 3-0 L27IN ABSRB UD L26MM SH

## (undated) DEVICE — DRAPE SHEET LG

## (undated) DEVICE — STRAP OR POS W3.5XL19IN FOAM STRRP W/ SLIP

## (undated) DEVICE — SOLUTION IRRIG 1000ML 0.9% NACL USP BTL

## (undated) DEVICE — ADHESIVE SKIN TOP FOR WND CLSR DERMBND ADV

## (undated) DEVICE — STERILE TETRA-FLEX CF, ELASTIC BANDAGE, 4" X 5.5YD: Brand: TETRA-FLEX™CF